# Patient Record
Sex: FEMALE | NOT HISPANIC OR LATINO | URBAN - METROPOLITAN AREA
[De-identification: names, ages, dates, MRNs, and addresses within clinical notes are randomized per-mention and may not be internally consistent; named-entity substitution may affect disease eponyms.]

---

## 2022-01-17 ENCOUNTER — TELEPHONE (OUTPATIENT)
Dept: PSYCHIATRY | Facility: CLINIC | Age: 27
End: 2022-01-17

## 2022-01-17 ENCOUNTER — LAB (OUTPATIENT)
Dept: LAB | Facility: HOSPITAL | Age: 27
End: 2022-01-17

## 2022-01-17 ENCOUNTER — OFFICE VISIT (OUTPATIENT)
Dept: PSYCHIATRY | Facility: CLINIC | Age: 27
End: 2022-01-17

## 2022-01-17 VITALS — WEIGHT: 289.2 LBS | DIASTOLIC BLOOD PRESSURE: 83 MMHG | SYSTOLIC BLOOD PRESSURE: 124 MMHG

## 2022-01-17 DIAGNOSIS — F31.4 BIPOLAR AFFECTIVE DISORDER, DEPRESSED, SEVERE: ICD-10-CM

## 2022-01-17 DIAGNOSIS — F31.4 BIPOLAR AFFECTIVE DISORDER, DEPRESSED, SEVERE: Primary | Chronic | ICD-10-CM

## 2022-01-17 DIAGNOSIS — F41.1 GAD (GENERALIZED ANXIETY DISORDER): Chronic | ICD-10-CM

## 2022-01-17 DIAGNOSIS — F41.1 GAD (GENERALIZED ANXIETY DISORDER): ICD-10-CM

## 2022-01-17 DIAGNOSIS — F90.0 ATTENTION DEFICIT HYPERACTIVITY DISORDER (ADHD), PREDOMINANTLY INATTENTIVE TYPE: Chronic | ICD-10-CM

## 2022-01-17 PROBLEM — F90.9 ATTENTION DEFICIT HYPERACTIVITY DISORDER: Chronic | Status: ACTIVE | Noted: 2022-01-17

## 2022-01-17 LAB
ALBUMIN SERPL-MCNC: 4.3 G/DL (ref 3.5–5.2)
ALBUMIN/GLOB SERPL: 1.4 G/DL
ALP SERPL-CCNC: 69 U/L (ref 39–117)
ALT SERPL W P-5'-P-CCNC: 18 U/L (ref 1–33)
ANION GAP SERPL CALCULATED.3IONS-SCNC: 10.1 MMOL/L (ref 5–15)
AST SERPL-CCNC: 16 U/L (ref 1–32)
BASOPHILS # BLD AUTO: 0.05 10*3/MM3 (ref 0–0.2)
BASOPHILS NFR BLD AUTO: 0.5 % (ref 0–1.5)
BILIRUB SERPL-MCNC: 0.2 MG/DL (ref 0–1.2)
BUN SERPL-MCNC: 15 MG/DL (ref 6–20)
BUN/CREAT SERPL: 23.4 (ref 7–25)
CALCIUM SPEC-SCNC: 9.5 MG/DL (ref 8.6–10.5)
CHLORIDE SERPL-SCNC: 102 MMOL/L (ref 98–107)
CHOLEST SERPL-MCNC: 146 MG/DL (ref 0–200)
CO2 SERPL-SCNC: 24.9 MMOL/L (ref 22–29)
CREAT SERPL-MCNC: 0.64 MG/DL (ref 0.57–1)
DEPRECATED RDW RBC AUTO: 45.6 FL (ref 37–54)
EOSINOPHIL # BLD AUTO: 0.11 10*3/MM3 (ref 0–0.4)
EOSINOPHIL NFR BLD AUTO: 1.1 % (ref 0.3–6.2)
ERYTHROCYTE [DISTWIDTH] IN BLOOD BY AUTOMATED COUNT: 14.5 % (ref 12.3–15.4)
GFR SERPL CREATININE-BSD FRML MDRD: 112 ML/MIN/1.73
GFR SERPL CREATININE-BSD FRML MDRD: 136 ML/MIN/1.73
GLOBULIN UR ELPH-MCNC: 3 GM/DL
GLUCOSE SERPL-MCNC: 79 MG/DL (ref 65–99)
HBA1C MFR BLD: 5.3 % (ref 3.5–5.6)
HCT VFR BLD AUTO: 39.2 % (ref 34–46.6)
HDLC SERPL-MCNC: 55 MG/DL (ref 40–60)
HGB BLD-MCNC: 12.9 G/DL (ref 12–15.9)
IMM GRANULOCYTES # BLD AUTO: 0.02 10*3/MM3 (ref 0–0.05)
IMM GRANULOCYTES NFR BLD AUTO: 0.2 % (ref 0–0.5)
LDLC SERPL CALC-MCNC: 72 MG/DL (ref 0–100)
LDLC/HDLC SERPL: 1.29 {RATIO}
LYMPHOCYTES # BLD AUTO: 3.82 10*3/MM3 (ref 0.7–3.1)
LYMPHOCYTES NFR BLD AUTO: 37.2 % (ref 19.6–45.3)
MCH RBC QN AUTO: 28.5 PG (ref 26.6–33)
MCHC RBC AUTO-ENTMCNC: 32.9 G/DL (ref 31.5–35.7)
MCV RBC AUTO: 86.5 FL (ref 79–97)
MONOCYTES # BLD AUTO: 0.59 10*3/MM3 (ref 0.1–0.9)
MONOCYTES NFR BLD AUTO: 5.7 % (ref 5–12)
NEUTROPHILS NFR BLD AUTO: 5.68 10*3/MM3 (ref 1.7–7)
NEUTROPHILS NFR BLD AUTO: 55.3 % (ref 42.7–76)
NRBC BLD AUTO-RTO: 0 /100 WBC (ref 0–0.2)
PLATELET # BLD AUTO: 413 10*3/MM3 (ref 140–450)
PMV BLD AUTO: 10.1 FL (ref 6–12)
POTASSIUM SERPL-SCNC: 4.5 MMOL/L (ref 3.5–5.2)
PROT SERPL-MCNC: 7.3 G/DL (ref 6–8.5)
RBC # BLD AUTO: 4.53 10*6/MM3 (ref 3.77–5.28)
SODIUM SERPL-SCNC: 137 MMOL/L (ref 136–145)
T4 FREE SERPL-MCNC: 1.08 NG/DL (ref 0.93–1.7)
TRIGL SERPL-MCNC: 101 MG/DL (ref 0–150)
TSH SERPL DL<=0.05 MIU/L-ACNC: 1 UIU/ML (ref 0.27–4.2)
VLDLC SERPL-MCNC: 19 MG/DL (ref 5–40)
WBC NRBC COR # BLD: 10.27 10*3/MM3 (ref 3.4–10.8)

## 2022-01-17 PROCEDURE — 83036 HEMOGLOBIN GLYCOSYLATED A1C: CPT

## 2022-01-17 PROCEDURE — 36415 COLL VENOUS BLD VENIPUNCTURE: CPT

## 2022-01-17 PROCEDURE — 80050 GENERAL HEALTH PANEL: CPT

## 2022-01-17 PROCEDURE — 90792 PSYCH DIAG EVAL W/MED SRVCS: CPT

## 2022-01-17 PROCEDURE — 84439 ASSAY OF FREE THYROXINE: CPT

## 2022-01-17 PROCEDURE — 80061 LIPID PANEL: CPT

## 2022-01-17 RX ORDER — LAMOTRIGINE 25 MG/1
25 TABLET ORAL DAILY
Qty: 15 TABLET | Refills: 0 | Status: SHIPPED | OUTPATIENT
Start: 2022-01-17 | End: 2022-01-31 | Stop reason: DRUGHIGH

## 2022-01-17 RX ORDER — LURASIDONE HYDROCHLORIDE 20 MG/1
20 TABLET, FILM COATED ORAL NIGHTLY
Qty: 30 TABLET | Refills: 0 | Status: SHIPPED | OUTPATIENT
Start: 2022-01-17 | End: 2022-01-17

## 2022-01-17 RX ORDER — BUSPIRONE HYDROCHLORIDE 15 MG/1
15 TABLET ORAL 2 TIMES DAILY
Qty: 60 TABLET | Refills: 2 | Status: SHIPPED | OUTPATIENT
Start: 2022-01-17 | End: 2022-06-09 | Stop reason: SDUPTHER

## 2022-01-17 NOTE — TELEPHONE ENCOUNTER
Latuda is over $1000.00 coupon will take off $400. Patient can not afford this. She will need something more affordable.              SHAISTA SAVINGS CARD:( [facility]  BIN: [id number]  GRP:[id number]  PCN: [id number]  ID: [id number]

## 2022-01-31 ENCOUNTER — TELEPHONE (OUTPATIENT)
Dept: PSYCHIATRY | Facility: CLINIC | Age: 27
End: 2022-01-31

## 2022-01-31 DIAGNOSIS — F41.1 GAD (GENERALIZED ANXIETY DISORDER): Chronic | ICD-10-CM

## 2022-01-31 DIAGNOSIS — F31.4 BIPOLAR AFFECTIVE DISORDER, DEPRESSED, SEVERE: Primary | Chronic | ICD-10-CM

## 2022-01-31 RX ORDER — LAMOTRIGINE 100 MG/1
TABLET ORAL
Qty: 32 TABLET | Refills: 0 | Status: SHIPPED | OUTPATIENT
Start: 2022-01-31 | End: 2022-03-09 | Stop reason: DRUGHIGH

## 2022-03-09 ENCOUNTER — OFFICE VISIT (OUTPATIENT)
Dept: PSYCHIATRY | Facility: CLINIC | Age: 27
End: 2022-03-09

## 2022-03-09 DIAGNOSIS — F31.4 BIPOLAR AFFECTIVE DISORDER, DEPRESSED, SEVERE: Chronic | ICD-10-CM

## 2022-03-09 DIAGNOSIS — F41.1 GAD (GENERALIZED ANXIETY DISORDER): Chronic | ICD-10-CM

## 2022-03-09 PROCEDURE — 99214 OFFICE O/P EST MOD 30 MIN: CPT | Performed by: PHYSICIAN ASSISTANT

## 2022-03-09 RX ORDER — LAMOTRIGINE 200 MG/1
200 TABLET ORAL DAILY
Qty: 90 TABLET | Refills: 1 | Status: SHIPPED | OUTPATIENT
Start: 2022-03-09 | End: 2022-06-09 | Stop reason: SDUPTHER

## 2022-03-09 RX ORDER — DESVENLAFAXINE 25 MG/1
25 TABLET, EXTENDED RELEASE ORAL EVERY MORNING
Qty: 30 TABLET | Refills: 3 | Status: SHIPPED | OUTPATIENT
Start: 2022-03-09 | End: 2022-03-09 | Stop reason: SDUPTHER

## 2022-03-09 RX ORDER — DESVENLAFAXINE 25 MG/1
25 TABLET, EXTENDED RELEASE ORAL EVERY MORNING
Qty: 30 TABLET | Refills: 3 | Status: SHIPPED | OUTPATIENT
Start: 2022-03-09 | End: 2022-06-09 | Stop reason: SDUPTHER

## 2022-03-09 NOTE — PROGRESS NOTES
Subjective   Kaylee Vergara is a 26 y.o. female who presents today for follow up in the office.  Patient was scheduled with the wrong provider today, and ARTHUR Brumfield is off today, so I saw the patient and she will schedule with him for next visit    Chief Complaint: Bipolar depression, anxiety with ADHD concern.    History of Present Illness:  Patient reports feeling better already since starting Lamictal (now on 200 mg daily) and Buspar, but still concerned about her depression (mom takes Pristiq and doing well on it)  -Started Effexor after had first child in 2019 due to postpartum depression.  -Been off previous meds (Abilify, buspirone, and Effexor) for nearly a month, since mid-December, because she moved to the area from [place] in  and couldn't obtain refills.  -Previously took 15mg Abilify per her last psychiatrist since .  -Pt says she has not felt like herself since after she started having kids.  -Pt c/o concentration and focus difficulty that has been present her entire life, and she decided she should be evaluated for ADHD before starting her new job.  -Admits to SI w/o ever establishing plans or anticipating actions. -Denies HI or AVH.  Possible Manic Sx on MDQ: 7  Depression 4/10 (was a 9/10), doing better since starting Lamictal and Buspar  Anxiety 2/10    -lives with  and 3 boys. 2 of which are twins born toward end of .    The following portions of the patient's history were reviewed and updated as appropriate: allergies, current medications, past family history, past medical history, past social history, past surgical history and problem list.    PAST OUTPATIENT TREATMENT  Past Psychiatric History:  Diagnoses: Bipolar d/o, RODERICK, Postpartum Depression.  Previous Psychiatrist: In [place], made bipolar dx.  Therapist: Denied.   Psychosis, Anx/Dep: Postpartum Depression .  Genesight testing done 2022, normal converter of folic acid  Medication  Trials:  Wellbutrin - worse anxiety.  Zoloft - sedating all the time  Effexor - inefficacy up to 150mg.  Abilify - inefficacy up to 15mg.  Buspirone - previously tried up to 10mg BID w/o efficacy, helpful at 15 mg BID  Latuda - cost prohibitive.  Lamictal, helpful  Sequelae Of Mental Disorder:  job disruption, family disruption, emotional distress    Interval History  Improved    Side Effects  Denied.    Past psychiatric history was reviewed and compared to 1/17/2022 visit and appropriate updates were made.    Past Medical History:  Past Medical History:   Diagnosis Date   • Anxiety    • Depression      Social History:  Social History     Socioeconomic History   • Marital status:    Tobacco Use   • Smoking status: Never Smoker   • Smokeless tobacco: Never Used   Substance and Sexual Activity   • Alcohol use: No   • Drug use: Yes     Frequency: 1.0 times per week     Types: Marijuana     Comment: smokes cannabis weekly.     Family History:  History reviewed. No pertinent family history.  Past Surgical History:  Past Surgical History:   Procedure Laterality Date   • KNEE SURGERY      BOTH SIDES     Problem List:  Patient Active Problem List   Diagnosis   • RODERICK (generalized anxiety disorder)   • Bipolar affective disorder, depressed, severe (HCC)   • Attention deficit hyperactivity disorder     Allergy:   Allergies   Allergen Reactions   • Biaxin [Clarithromycin]    • Ceclor [Cefaclor]       Discontinued Medications:  Medications Discontinued During This Encounter   Medication Reason   • sertraline (ZOLOFT) 50 MG tablet *Therapy completed   • lamoTRIgine (LaMICtal) 100 MG tablet Dose adjustment     Current Medications:   Current Outpatient Medications   Medication Sig Dispense Refill   • busPIRone (BUSPAR) 15 MG tablet Take 1 tablet by mouth 2 (Two) Times a Day. 60 tablet 2   • Desvenlafaxine Succinate ER 25 MG tablet sustained-release 24 hour Take 1 tablet by mouth Every Morning. 30 tablet 3   • lamoTRIgine  (LaMICtal) 200 MG tablet Take 1 tablet by mouth Daily. 90 tablet 1     No current facility-administered medications for this visit.     Psychological ROS: positive for - anxiety, concentration difficulties, depression, memory difficulties, sleep disturbances and suicidal ideation  negative for - disorientation, hallucinations or hostility    Vital Signs:   There were no vitals taken for this visit.   Wt: 289.2lbs (1/17/22)  BP: 124/83 (1/17/22)    Mental Status Exam:   Orientation:  To person, Place, Time and Situation  Memory: Recent and remote memory Intact  Mood/Affect: Normal  Hopelessness: Denies  Suicidal Ideations: Suicidal Ideation  Homicidal Ideations:  None  Hallucinations: None  Delusions:  None  Obsessions: None  Behavior and Psychomotor Activity: Appropriate  Speech:  Normal  Thought Process: Goal directed, Linear and Circum  Thought Content: Normal  Associations: Intact  Language: name objects and repeat phrases  Concentration and computation: Fair  Attention Span: Poor  Fund of Knowledge: Fair  Reliability: fair  Insight into mental health: Fair  Judgement: Fair  Impulse Control:  Fair  Hygiene: good  Cooperation:  Cooperative  Eye Contact:  Good  Physical/Medical Issues:  No     Mental status exam was reviewed and compared to 1/17/2022 visit and appropriate updates were made.    PHQ-9 Depression Screening  Little interest or pleasure in doing things?     Feeling down, depressed, or hopeless?     Trouble falling or staying asleep, or sleeping too much?     Feeling tired or having little energy?     Poor appetite or overeating?     Feeling bad about yourself - or that you are a failure or have let yourself or your family down?     Trouble concentrating on things, such as reading the newspaper or watching television?     Moving or speaking so slowly that other people could have noticed? Or the opposite - being so fidgety or restless that you have been moving around a lot more than usual?     Thoughts  that you would be better off dead, or of hurting yourself in some way?     PHQ-9 Total Score  10   If you checked off any problems, how difficult have these problems made it for you to do your work, take care of things at home, or get along with other people?  Somewhat difficult      PHQ-9: 10  RODERICK-7:  6  MDQ: Positive; likely bipolar d/o w/ 7 manic sx and hx of dx. (1/17/22)  ASRS: 33 (Inattentive); 28 (Hyperactive). Highly likely ADHD. (1/17/22)    Never smoker; smokes cannabis weekly.    I advised Kaylee of the risks of tobacco use.     Lab Results: Reviewed.    EKG Results:  No orders to display     Assessment/Plan   Diagnoses and all orders for this visit:    1. Bipolar affective disorder, depressed, severe (HCC)    2. RODERICK (generalized anxiety disorder)    Other orders  -     lamoTRIgine (LaMICtal) 200 MG tablet; Take 1 tablet by mouth Daily.  Dispense: 90 tablet; Refill: 1  -     Desvenlafaxine Succinate ER 25 MG tablet sustained-release 24 hour; Take 1 tablet by mouth Every Morning.  Dispense: 30 tablet; Refill: 3    Continue 15mg Buspirone BID for improved anxiety control  Continue Lamictal 200 mg daily for mood stabilizer  [employer] results were reviewed with the patient and she was given a copy for her records.  Start Pristiq 25 mg daily for depression, it is use as directed per [employer] and her mom has been successful with Pristiq, as well.  Consider ADHD tx. at next visit when she meets again with Severino Brumfield    Visit Diagnoses:    ICD-10-CM ICD-9-CM   1. Bipolar affective disorder, depressed, severe (HCC)  F31.4 296.53   2. RODERICK (generalized anxiety disorder)  F41.1 300.02        TREATMENT PLAN/GOALS: In the short-term, the patient is to continue supportive psychotherapy efforts and medications as indicated. Treatment and medication options were discussed during today's visit. Long-term the goal will be to control symptoms so they do not negatively interfere with other aspects of the patient's  life. Prognosis is optimistic with implementation of the current treatment plan. Patient ackowledged and verbally consented to the treatment plan and was educated on the importance of compliance with treatment and follow-up appointments.    MEDICATION ISSUES:  INSPECT reviewed and is as expected. No controlled Rxs.    Discussed medication options and treatment plan of prescribed medication as well as the risks, benefits, and side effects including potential falls, possible impaired driving, and metabolic adversities among others. Patient counseled on a multimodal approach with healthy nutrition, healthy sleep, regular physical activity, social activity, counseling, and medication taking part in his/her psychiatric management. Patient is agreeable to the plan, and he/she agreed to call the office with any worsening of symptoms or onset of side effects. Patient is agreeable to call 911 or go to the nearest ER should he/she begin having SI/HI with plans or anticipated actions or self-harming behavior.    Assisted patient in processing above session content; acknowledged and normalized patient’s thoughts, feelings, and concerns. Reviewed positive coping skills and behavior management in session with positive framing of thoughts. Allowed patient to freely discuss issues without interruption or judgment. Provided safe, confidential environment to facilitate the development of positive therapeutic relationship and encourage open and honest communication.    MEDS ORDERED DURING VISIT:  New Medications Ordered This Visit   Medications   • lamoTRIgine (LaMICtal) 200 MG tablet     Sig: Take 1 tablet by mouth Daily.     Dispense:  90 tablet     Refill:  1   • Desvenlafaxine Succinate ER 25 MG tablet sustained-release 24 hour     Sig: Take 1 tablet by mouth Every Morning.     Dispense:  30 tablet     Refill:  3     Return in about 3 months (around 6/9/2022).     This document has been electronically signed by Nataliya Marie  EV  March 9, 2022 11:53 EST    EMR Dragon transcription disclaimer:  Part of this note may be an electronic transcription/translation of spoken language to printed text using the Dragon Dictation System.

## 2022-03-15 RX ORDER — DESVENLAFAXINE 25 MG/1
25 TABLET, EXTENDED RELEASE ORAL EVERY MORNING
Qty: 30 TABLET | Refills: 3 | Status: CANCELLED | OUTPATIENT
Start: 2022-03-15

## 2022-03-15 NOTE — TELEPHONE ENCOUNTER
Desvenlafaxine Succ ER 25mg take one tab in the morning. Error This medication was filled 3/9/2022 with three refills

## 2022-06-09 ENCOUNTER — OFFICE VISIT (OUTPATIENT)
Dept: PSYCHIATRY | Facility: CLINIC | Age: 27
End: 2022-06-09

## 2022-06-09 VITALS
HEART RATE: 66 BPM | OXYGEN SATURATION: 98 % | WEIGHT: 258 LBS | HEIGHT: 68 IN | SYSTOLIC BLOOD PRESSURE: 112 MMHG | BODY MASS INDEX: 39.1 KG/M2 | DIASTOLIC BLOOD PRESSURE: 70 MMHG

## 2022-06-09 DIAGNOSIS — F51.05 INSOMNIA DUE TO MENTAL DISORDER: Chronic | ICD-10-CM

## 2022-06-09 DIAGNOSIS — F31.30 BIPOLAR I DISORDER, MOST RECENT EPISODE DEPRESSED: Primary | Chronic | ICD-10-CM

## 2022-06-09 DIAGNOSIS — F41.1 GAD (GENERALIZED ANXIETY DISORDER): Chronic | ICD-10-CM

## 2022-06-09 PROBLEM — E66.9 OBESITY WITH BODY MASS INDEX 30 OR GREATER: Status: ACTIVE | Noted: 2019-06-27

## 2022-06-09 PROCEDURE — 99214 OFFICE O/P EST MOD 30 MIN: CPT

## 2022-06-09 RX ORDER — AMOXICILLIN 500 MG/1
500 CAPSULE ORAL 2 TIMES DAILY
COMMUNITY
Start: 2022-06-07 | End: 2022-08-09

## 2022-06-09 RX ORDER — LAMOTRIGINE 200 MG/1
200 TABLET ORAL DAILY
Qty: 90 TABLET | Refills: 1 | Status: SHIPPED | OUTPATIENT
Start: 2022-06-09 | End: 2022-11-15 | Stop reason: SDUPTHER

## 2022-06-09 RX ORDER — BUSPIRONE HYDROCHLORIDE 15 MG/1
15 TABLET ORAL 2 TIMES DAILY
Qty: 180 TABLET | Refills: 1 | Status: SHIPPED | OUTPATIENT
Start: 2022-06-09 | End: 2022-11-15 | Stop reason: SDUPTHER

## 2022-06-09 RX ORDER — TRAZODONE HYDROCHLORIDE 50 MG/1
50 TABLET ORAL NIGHTLY
Qty: 30 TABLET | Refills: 2 | Status: SHIPPED | OUTPATIENT
Start: 2022-06-09 | End: 2022-11-15

## 2022-06-09 RX ORDER — PROMETHAZINE HYDROCHLORIDE 25 MG/1
SUPPOSITORY RECTAL
COMMUNITY
End: 2022-08-09

## 2022-06-09 RX ORDER — DESVENLAFAXINE 25 MG/1
25 TABLET, EXTENDED RELEASE ORAL EVERY MORNING
Qty: 90 TABLET | Refills: 1 | Status: SHIPPED | OUTPATIENT
Start: 2022-06-09 | End: 2022-08-09

## 2022-06-09 RX ORDER — ONDANSETRON 8 MG/1
TABLET, ORALLY DISINTEGRATING ORAL EVERY 12 HOURS SCHEDULED
COMMUNITY
End: 2022-08-09

## 2022-06-09 RX ORDER — LIRAGLUTIDE 6 MG/ML
3 INJECTION, SOLUTION SUBCUTANEOUS
COMMUNITY
End: 2022-08-09

## 2022-08-09 ENCOUNTER — OFFICE VISIT (OUTPATIENT)
Dept: PSYCHIATRY | Facility: CLINIC | Age: 27
End: 2022-08-09

## 2022-08-09 VITALS
HEART RATE: 83 BPM | WEIGHT: 259.2 LBS | OXYGEN SATURATION: 99 % | BODY MASS INDEX: 39.41 KG/M2 | SYSTOLIC BLOOD PRESSURE: 118 MMHG | DIASTOLIC BLOOD PRESSURE: 78 MMHG

## 2022-08-09 DIAGNOSIS — F31.30 BIPOLAR I DISORDER, MOST RECENT EPISODE DEPRESSED: Primary | Chronic | ICD-10-CM

## 2022-08-09 DIAGNOSIS — F41.1 GAD (GENERALIZED ANXIETY DISORDER): Chronic | ICD-10-CM

## 2022-08-09 DIAGNOSIS — F90.0 ATTENTION DEFICIT HYPERACTIVITY DISORDER (ADHD), PREDOMINANTLY INATTENTIVE TYPE: Chronic | ICD-10-CM

## 2022-08-09 DIAGNOSIS — F51.05 INSOMNIA DUE TO MENTAL DISORDER: Chronic | ICD-10-CM

## 2022-08-09 PROCEDURE — 99214 OFFICE O/P EST MOD 30 MIN: CPT

## 2022-09-07 ENCOUNTER — HOSPITAL ENCOUNTER (OUTPATIENT)
Facility: HOSPITAL | Age: 27
Discharge: HOME | End: 2022-09-07
Attending: EMERGENCY MEDICINE

## 2022-09-07 VITALS
SYSTOLIC BLOOD PRESSURE: 142 MMHG | OXYGEN SATURATION: 100 % | DIASTOLIC BLOOD PRESSURE: 84 MMHG | HEART RATE: 103 BPM | WEIGHT: 255 LBS | RESPIRATION RATE: 16 BRPM | BODY MASS INDEX: 37.77 KG/M2 | HEIGHT: 69 IN | TEMPERATURE: 98.1 F

## 2022-09-07 DIAGNOSIS — J01.00 ACUTE MAXILLARY SINUSITIS, RECURRENCE NOT SPECIFIED: Primary | ICD-10-CM

## 2022-09-07 DIAGNOSIS — U07.1 COVID-19: ICD-10-CM

## 2022-09-07 LAB
FLUAV SUBTYP SPEC NAA+PROBE: NOT DETECTED
FLUBV RNA ISLT QL NAA+PROBE: NOT DETECTED
SARS-COV-2 RNA RESP QL NAA+PROBE: NOT DETECTED

## 2022-09-07 PROCEDURE — EDLOS: Performed by: EMERGENCY MEDICINE

## 2022-09-07 PROCEDURE — 87636 SARSCOV2 & INF A&B AMP PRB: CPT | Performed by: EMERGENCY MEDICINE

## 2022-09-07 PROCEDURE — 99202 OFFICE O/P NEW SF 15 MIN: CPT | Performed by: EMERGENCY MEDICINE

## 2022-09-07 PROCEDURE — G0463 HOSPITAL OUTPT CLINIC VISIT: HCPCS | Performed by: EMERGENCY MEDICINE

## 2022-09-07 RX ORDER — NAPROXEN 500 MG/1
500 TABLET ORAL 2 TIMES DAILY WITH MEALS
Qty: 20 TABLET | Refills: 0 | Status: SHIPPED | OUTPATIENT
Start: 2022-09-07 | End: 2022-09-17

## 2022-09-07 RX ORDER — AMOXICILLIN AND CLAVULANATE POTASSIUM 875; 125 MG/1; MG/1
1 TABLET, FILM COATED ORAL 2 TIMES DAILY
Qty: 20 TABLET | Refills: 0 | Status: SHIPPED | OUTPATIENT
Start: 2022-09-07 | End: 2022-09-17

## 2022-10-22 ENCOUNTER — HOSPITAL ENCOUNTER (OUTPATIENT)
Facility: HOSPITAL | Age: 27
Discharge: HOME | End: 2022-10-22
Attending: EMERGENCY MEDICINE

## 2022-10-22 VITALS
TEMPERATURE: 100.7 F | RESPIRATION RATE: 18 BRPM | WEIGHT: 255 LBS | SYSTOLIC BLOOD PRESSURE: 153 MMHG | HEIGHT: 69 IN | DIASTOLIC BLOOD PRESSURE: 97 MMHG | HEART RATE: 115 BPM | BODY MASS INDEX: 37.77 KG/M2 | OXYGEN SATURATION: 96 %

## 2022-10-22 DIAGNOSIS — R11.2 NAUSEA VOMITING AND DIARRHEA: ICD-10-CM

## 2022-10-22 DIAGNOSIS — B34.9 VIRAL ILLNESS: Primary | ICD-10-CM

## 2022-10-22 DIAGNOSIS — U07.1 COVID: ICD-10-CM

## 2022-10-22 DIAGNOSIS — R19.7 NAUSEA VOMITING AND DIARRHEA: ICD-10-CM

## 2022-10-22 LAB
FLUAV SUBTYP SPEC NAA+PROBE: NOT DETECTED
FLUBV RNA ISLT QL NAA+PROBE: NOT DETECTED
SARS-COV-2 RNA RESP QL NAA+PROBE: DETECTED

## 2022-10-22 PROCEDURE — 99212 OFFICE O/P EST SF 10 MIN: CPT | Performed by: EMERGENCY MEDICINE

## 2022-10-22 PROCEDURE — EDLOS: Performed by: EMERGENCY MEDICINE

## 2022-10-22 PROCEDURE — 87636 SARSCOV2 & INF A&B AMP PRB: CPT | Performed by: EMERGENCY MEDICINE

## 2022-10-22 PROCEDURE — 63710000001 ONDANSETRON ODT 4 MG TABLET DISPERSIBLE: Performed by: EMERGENCY MEDICINE

## 2022-10-22 PROCEDURE — G0463 HOSPITAL OUTPT CLINIC VISIT: HCPCS | Performed by: EMERGENCY MEDICINE

## 2022-10-22 RX ORDER — ACETAMINOPHEN 500 MG
1000 TABLET ORAL ONCE
Status: COMPLETED | OUTPATIENT
Start: 2022-10-22 | End: 2022-10-22

## 2022-10-22 RX ORDER — ONDANSETRON 4 MG/1
4 TABLET, ORALLY DISINTEGRATING ORAL EVERY 8 HOURS PRN
Qty: 15 TABLET | Refills: 0 | Status: SHIPPED | OUTPATIENT
Start: 2022-10-22

## 2022-10-22 RX ORDER — ONDANSETRON 4 MG/1
4 TABLET, ORALLY DISINTEGRATING ORAL ONCE
Status: COMPLETED | OUTPATIENT
Start: 2022-10-22 | End: 2022-10-22

## 2022-10-22 RX ADMIN — ACETAMINOPHEN 1000 MG: 500 TABLET ORAL at 08:44

## 2022-10-22 RX ADMIN — ONDANSETRON 4 MG: 4 TABLET, ORALLY DISINTEGRATING ORAL at 08:44

## 2022-11-13 NOTE — PROGRESS NOTES
"Subjective   Kaylee Vergara is a 27 y.o. female who presents today for follow up for psychiatric medication management. Patient is new to this provider but previously saw SOFIA Johnson.     Chief Complaint:  \"I'm not doing very well.\"    History of Present Illness:   Patient states she was doing well on her current medications in the summer and up until September. In September she moved into a new house with her  and children and things were busy and she stopped taking all her her medication. At the end of September, she went back on all her medications, but she still has not been doing well.     Patient found out two weeks ago that  was speaking to another person outside their marriage. This has really upset her.   Her family just moved into a new house in September.   Patient states \"I've been very angry and sad at the beginning of the day.\"   \"I'm very easily frustrated with my children.\"  \"I'm very forgetful.\"   \"I can't focus on something simple, like cleaning the kitchen.\"     Sleep is not well at all. Sleeping only 3.5-4 hours at night.   SI--no active plans to do anything, but since the affair, states she just wants the day to end. Feels safe calling if she were to have worsening SI.     Appetite, not good.     Overall, the patient states she is just not doing well like she was back in the summer. She reports an inability to focus on her daily activities and she believes if she had some help with her attention and focus, it would lessen some of her anxiety. The provider she previously saw here in the clinic, did complete an ASRS screening on her. Results as follows: ASRS: 33 (Inattentive); 28 (Hyperactive). Highly likely ADHD. (1/17/22)    The following portions of the patient's history were reviewed and updated as appropriate: allergies, current medications, past family history, past medical history, past social history, past surgical history and problem list.    PAST PSYCHIATRIC " HISTORY  Axis I  Generalized anxiety disorder  Bipolar disorder  ADHD  Axis II  None    PAST OUTPATIENT TREATMENT  Diagnosis treated:  Bipolar Disorder  Generalized anxiety disorder  Postpartum depression    Treatment Type:  Medication Management  Genesight testing done Jan 2022, normal converter of folic acid.  Prior Psychiatric Medications:  Wellbutrin - worse anxiety.  Zoloft - sedating all the time  Effexor - inefficacy up to 150mg.  Abilify - inefficacy up to 15mg.  Buspirone - up to 10mg BID w/o efficacy, helpful at 15 mg BID.  Latuda - cost prohibitive.  Lamictal - helpful  Vraylar-effective  Pristiq-ineffective at 25 mg.  Trazodone-effective for sleep at 50 mg.  Support Groups:  None   Sequelae Of Mental Disorder:  family disruption, emotional distress    Interval History  Deteriorated    Side Effects  No side effects noted.       Past Medical History:  Past Medical History:   Diagnosis Date   • Anxiety    • Depression        Social History:  Social History     Socioeconomic History   • Marital status:    Tobacco Use   • Smoking status: Never   • Smokeless tobacco: Never   Vaping Use   • Vaping Use: Never used   Substance and Sexual Activity   • Alcohol use: No     Comment: socially   • Drug use: Yes     Frequency: 1.0 times per week     Types: Marijuana     Comment: smokes cannabis weekly.       Family History:  No family history on file.    Past Surgical History:  Past Surgical History:   Procedure Laterality Date   • KNEE SURGERY      BOTH SIDES       Problem List:  Patient Active Problem List   Diagnosis   • RODERICK (generalized anxiety disorder)   • Bipolar I disorder, most recent episode depressed (HCC)   • Attention deficit hyperactivity disorder   • Insomnia due to mental disorder   • Obesity with body mass index 30 or greater       Allergy:   Allergies   Allergen Reactions   • Biaxin [Clarithromycin]    • Ceclor [Cefaclor]         Discontinued Medications:  Medications Discontinued During This  Encounter   Medication Reason   • Desvenlafaxine Succinate ER 25 MG tablet sustained-release 24 hour *Therapy completed   • traZODone (DESYREL) 50 MG tablet    • busPIRone (BUSPAR) 15 MG tablet Reorder   • lamoTRIgine (LaMICtal) 200 MG tablet Reorder   • Cariprazine HCl (Vraylar) 3 MG capsule capsule        Current Medications:   Current Outpatient Medications   Medication Sig Dispense Refill   • busPIRone (BUSPAR) 15 MG tablet Take 1 tablet by mouth 2 (Two) Times a Day. 180 tablet 1   • Cariprazine HCl (Vraylar) 3 MG capsule capsule Take 1 capsule by mouth Every Morning. 30 capsule 2   • lamoTRIgine (LaMICtal) 200 MG tablet Take 1 tablet by mouth Daily. 90 tablet 1   • traZODone (DESYREL) 50 MG tablet Take 2 tablets by mouth Every Night. 30 tablet 2   • atomoxetine (Strattera) 40 MG capsule Take 1 capsule by mouth Daily. In the morning 30 capsule 1   • hydrOXYzine (ATARAX) 25 MG tablet Take 1 tablet by mouth 2 (Two) Times a Day As Needed for Anxiety. 60 tablet 1   • ondansetron ODT (ZOFRAN-ODT) 4 MG disintegrating tablet Place 1 tablet on the tongue Every 8 (Eight) Hours As Needed for Nausea or Vomiting. 15 tablet 0     No current facility-administered medications for this visit.         Psychological ROS: positive for - anxiety, concentration difficulties, depression, irritability and sleep disturbances  negative for - behavioral disorder, hallucinations, hostility or mood swings      Physical Exam:   Blood pressure 108/72, pulse 80, weight 114 kg (250 lb 6.4 oz), SpO2 99 %.    Mental Status Exam:   Hygiene:   good  Cooperation:  Cooperative  Eye Contact:  Good  Psychomotor Behavior:  Appropriate  Affect:  Appropriate  Mood: depressed and anxious  Speech:  Normal  Thought Process:  Goal directed and Linear  Thought Content:  Normal  Suicidal:  None  Homicidal:  None  Hallucinations:  None  Delusion:  None  Memory:  Intact  Orientation:  Person, Place, Time and Situation  Reliability:  good  Insight:   Good  Judgement:  Good  Impulse Control:  Good  Physical/Medical Issues:  No        PHQ-9 Depression Screening    Little interest or pleasure in doing things? 3-->nearly every day   Feeling down, depressed, or hopeless? 3-->nearly every day   Trouble falling or staying asleep, or sleeping too much? 3-->nearly every day   Feeling tired or having little energy? 2-->more than half the days   Poor appetite or overeating? 2-->more than half the days   Feeling bad about yourself - or that you are a failure or have let yourself or your family down? 2-->more than half the days   Trouble concentrating on things, such as reading the newspaper or watching television? 3-->nearly every day   Moving or speaking so slowly that other people could have noticed? Or the opposite - being so fidgety or restless that you have been moving around a lot more than usual? 2-->more than half the days   Thoughts that you would be better off dead, or of hurting yourself in some way? 0-->not at all   PHQ-9 Total Score 20   If you checked off any problems, how difficult have these problems made it for you to do your work, take care of things at home, or get along with other people? very difficult            Never smoker    I advised Kaylee of the risks of tobacco use.     Result Review:  I have personally reviewed the results from the 1/17/22 and agree with these findings:  [x]  Laboratory  []  Microbiology  []  Radiology  []  EKG/Telemetry   []  Cardiology/Vascular   []  Pathology  []  Old records  []  Other:  Most notable findings include: No concerning laboratory findings.     Assessment & Plan   Diagnoses and all orders for this visit:    1. Bipolar I disorder, most recent episode depressed (HCC) (Primary)  -     Cariprazine HCl (Vraylar) 3 MG capsule capsule; Take 1 capsule by mouth Every Morning.  Dispense: 30 capsule; Refill: 2  -     lamoTRIgine (LaMICtal) 200 MG tablet; Take 1 tablet by mouth Daily.  Dispense: 90 tablet; Refill: 1    2.  RODERICK (generalized anxiety disorder)  -     busPIRone (BUSPAR) 15 MG tablet; Take 1 tablet by mouth 2 (Two) Times a Day.  Dispense: 180 tablet; Refill: 1  -     Cariprazine HCl (Vraylar) 3 MG capsule capsule; Take 1 capsule by mouth Every Morning.  Dispense: 30 capsule; Refill: 2  -     lamoTRIgine (LaMICtal) 200 MG tablet; Take 1 tablet by mouth Daily.  Dispense: 90 tablet; Refill: 1  -     hydrOXYzine (ATARAX) 25 MG tablet; Take 1 tablet by mouth 2 (Two) Times a Day As Needed for Anxiety.  Dispense: 60 tablet; Refill: 1    3. Attention deficit hyperactivity disorder (ADHD), predominantly inattentive type  -     atomoxetine (Strattera) 40 MG capsule; Take 1 capsule by mouth Daily. In the morning  Dispense: 30 capsule; Refill: 1    4. Insomnia due to mental disorder  -     traZODone (DESYREL) 50 MG tablet; Take 2 tablets by mouth Every Night.  Dispense: 30 tablet; Refill: 2         Visit Diagnoses:    ICD-10-CM ICD-9-CM   1. Bipolar I disorder, most recent episode depressed (HCC)  F31.30 296.50   2. RODERICK (generalized anxiety disorder)  F41.1 300.02   3. Attention deficit hyperactivity disorder (ADHD), predominantly inattentive type  F90.0 314.00   4. Insomnia due to mental disorder  F51.05 300.9     327.02       TREATMENT PLAN/GOALS: Continue supportive psychotherapy efforts and medications as indicated. Treatment and medication options discussed during today's visit. Patient ackowledged and verbally consented to continue with current treatment plan and was educated on the importance of compliance with treatment and follow-up appointments.    MEDICATION ISSUES:  INSPECT reviewed as expected    Start Straterra 40mg in the morning to help with focus and attention.   Stop taking Pristiq as patient does not see any benefit at this time.   Increase trazodone from 50mg nightly to 100mg nightly to improve sleep.   Continue Vraylar 3mg and lamotrigine 200mg daily for mood stabilization.   Start hydroxyzine 25mg BID PRN for  anxiety.     Discussed medication options and treatment plan of prescribed medication as well as the risks, benefits, and side effects including potential falls, possible impaired driving and metabolic adversities among others. Patient is agreeable to call the office with any worsening of symptoms or onset of side effects. Patient is agreeable to call 911 or go to the nearest ER should he/she begin having SI/HI. No medication side effects or related complaints today.     MEDS ORDERED DURING VISIT:  New Medications Ordered This Visit   Medications   • busPIRone (BUSPAR) 15 MG tablet     Sig: Take 1 tablet by mouth 2 (Two) Times a Day.     Dispense:  180 tablet     Refill:  1   • Cariprazine HCl (Vraylar) 3 MG capsule capsule     Sig: Take 1 capsule by mouth Every Morning.     Dispense:  30 capsule     Refill:  2   • lamoTRIgine (LaMICtal) 200 MG tablet     Sig: Take 1 tablet by mouth Daily.     Dispense:  90 tablet     Refill:  1   • traZODone (DESYREL) 50 MG tablet     Sig: Take 2 tablets by mouth Every Night.     Dispense:  30 tablet     Refill:  2   • hydrOXYzine (ATARAX) 25 MG tablet     Sig: Take 1 tablet by mouth 2 (Two) Times a Day As Needed for Anxiety.     Dispense:  60 tablet     Refill:  1   • atomoxetine (Strattera) 40 MG capsule     Sig: Take 1 capsule by mouth Daily. In the morning     Dispense:  30 capsule     Refill:  1       Return in about 4 weeks (around 12/13/2022).         This document has been electronically signed by ELE Shelton  November 15, 2022 09:53 EST    Part of this note may be an electronic transcription/translation of spoken language to printed text using the Dragon Dictation System.

## 2022-11-15 ENCOUNTER — OFFICE VISIT (OUTPATIENT)
Dept: PSYCHIATRY | Facility: CLINIC | Age: 27
End: 2022-11-15

## 2022-11-15 VITALS
SYSTOLIC BLOOD PRESSURE: 108 MMHG | BODY MASS INDEX: 36.98 KG/M2 | HEART RATE: 80 BPM | WEIGHT: 250.4 LBS | DIASTOLIC BLOOD PRESSURE: 72 MMHG | OXYGEN SATURATION: 99 %

## 2022-11-15 DIAGNOSIS — F51.05 INSOMNIA DUE TO MENTAL DISORDER: Chronic | ICD-10-CM

## 2022-11-15 DIAGNOSIS — F41.1 GAD (GENERALIZED ANXIETY DISORDER): Chronic | ICD-10-CM

## 2022-11-15 DIAGNOSIS — F90.0 ATTENTION DEFICIT HYPERACTIVITY DISORDER (ADHD), PREDOMINANTLY INATTENTIVE TYPE: Chronic | ICD-10-CM

## 2022-11-15 DIAGNOSIS — F31.30 BIPOLAR I DISORDER, MOST RECENT EPISODE DEPRESSED: Primary | Chronic | ICD-10-CM

## 2022-11-15 PROCEDURE — 99214 OFFICE O/P EST MOD 30 MIN: CPT

## 2022-11-15 RX ORDER — DESVENLAFAXINE 25 MG/1
25 TABLET, EXTENDED RELEASE ORAL EVERY MORNING
COMMUNITY
Start: 2022-11-03 | End: 2022-11-15

## 2022-11-15 RX ORDER — LAMOTRIGINE 200 MG/1
200 TABLET ORAL DAILY
Qty: 90 TABLET | Refills: 1 | Status: SHIPPED | OUTPATIENT
Start: 2022-11-15

## 2022-11-15 RX ORDER — TRAZODONE HYDROCHLORIDE 50 MG/1
100 TABLET ORAL NIGHTLY
Qty: 30 TABLET | Refills: 2 | Status: SHIPPED | OUTPATIENT
Start: 2022-11-15

## 2022-11-15 RX ORDER — BUSPIRONE HYDROCHLORIDE 15 MG/1
15 TABLET ORAL 2 TIMES DAILY
Qty: 180 TABLET | Refills: 1 | Status: SHIPPED | OUTPATIENT
Start: 2022-11-15 | End: 2022-12-13

## 2022-11-15 RX ORDER — HYDROXYZINE HYDROCHLORIDE 25 MG/1
25 TABLET, FILM COATED ORAL 2 TIMES DAILY PRN
Qty: 60 TABLET | Refills: 1 | Status: SHIPPED | OUTPATIENT
Start: 2022-11-15 | End: 2023-01-30

## 2022-11-15 RX ORDER — ATOMOXETINE 40 MG/1
40 CAPSULE ORAL DAILY
Qty: 30 CAPSULE | Refills: 1 | Status: SHIPPED | OUTPATIENT
Start: 2022-11-15 | End: 2023-01-19

## 2022-11-17 ENCOUNTER — OFFICE VISIT (OUTPATIENT)
Dept: PSYCHIATRY | Facility: CLINIC | Age: 27
End: 2022-11-17

## 2022-11-17 DIAGNOSIS — F41.1 GAD (GENERALIZED ANXIETY DISORDER): Chronic | ICD-10-CM

## 2022-11-17 DIAGNOSIS — F33.2 SEVERE EPISODE OF RECURRENT MAJOR DEPRESSIVE DISORDER, WITHOUT PSYCHOTIC FEATURES: Primary | ICD-10-CM

## 2022-11-17 PROCEDURE — 90791 PSYCH DIAGNOSTIC EVALUATION: CPT | Performed by: SOCIAL WORKER
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
## 2022-12-01 ENCOUNTER — OFFICE VISIT (OUTPATIENT)
Dept: PSYCHIATRY | Facility: CLINIC | Age: 27
End: 2022-12-01

## 2022-12-01 DIAGNOSIS — F51.05 INSOMNIA DUE TO MENTAL DISORDER: Chronic | ICD-10-CM

## 2022-12-01 DIAGNOSIS — F41.1 GAD (GENERALIZED ANXIETY DISORDER): Primary | Chronic | ICD-10-CM

## 2022-12-01 PROCEDURE — 90834 PSYTX W PT 45 MINUTES: CPT | Performed by: SOCIAL WORKER

## 2022-12-01 NOTE — PATIENT INSTRUCTIONS
Phone Apps for mood/anxiety and relaxation     Mood / Depression  Moodfit  MoodTools  Moodkit  Daylio  MoodTrack Diary  ImoodJournal   T2 Mood Tracker   Anxiety  Worry Watch  MAEVE Self-Help for Anxiety Management   MindShift   Relaxation  [id number]

## 2022-12-01 NOTE — PROGRESS NOTES
Date: December 1, 2022  Time In: 1006  Time Out: 1053      PROGRESS NOTE  Data:  Kaylee Vergara is a 27 y.o. female who presents today for individual therapy session at [facility] with EDGAR Vargas LCSW.       Clinical Maneuvering/Intervention: Kaylee is pleasant alert and oriented to person place and time.  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  The job will be in [facility], and she is hopeful that this will help their financial situation.  She and her  are working to work out the issues in their marriage.  They are still working with the marriage counselor, Torrey.  He has helped them with communication techniques and she feels that this has been helpful.  She is having difficulty sleeping, waking up between 1 AM and 4 AM at night.  She states her mind is racing and she has panic attacks.    Assisted patient in processing above session content; acknowledged and normalized patient’s thoughts, feelings, and concerns. Rationalized patient thought process regarding good sleep hygiene.  Discussed having a quiet time before bed; not having screen time before bed, and  going to bed at the same time each evening.  Discussed triggers associated with patient's anxiety. Also discussed coping skills for patient to implement such as guided imagery as a use of relaxation before bed and when she wakes up in the middle of the night.  Does not discuss journaling as a means of helping with anxiety, and writing down thoughts of gratitude.    Allowed patient to freely discuss issues without interruption or judgment. Provided safe, confidential environment to facilitate the development of positive therapeutic relationship and encourage open, honest communication. Assisted patient in identifying risk factors which would indicate the need for higher level of care including thoughts to harm self or others and/or self-harming behavior and encouraged patient to contact this office,  call 911, or present to the nearest emergency room should any of these events occur. Discussed crisis intervention services and means to access. Patient adamantly and convincingly denies current suicidal or homicidal ideation or perceptual disturbance.    Assessment   Patient appears to maintain relative stability as compared to their baseline. However, patient continues to struggle with depression and anxiety which continues to cause impairment in important areas of functioning. A result, they can be reasonably expected to continue to benefit from treatment and would likely be at increased risk for decompensation otherwise.    Mental Status Exam:   Hygiene:   good  Cooperation:  Cooperative  Eye Contact:  Good  Psychomotor Behavior:  Appropriate  Affect:  Tearful  Mood: depressed and anxious  Speech:  Normal  Thought Process:  Goal directed and Linear  Thought Content:  Normal  Suicidal:  None  Homicidal:  None  Hallucinations:  None  Delusion:  None  Memory:  Intact  Orientation:  Person, Place, Time and Situation  Reliability:  good  Insight:  Good  Judgement:  Good  Impulse Control:  Good  Physical/Medical Issues:  No      PHQ-Score Total:  PHQ-9 Total Score: PHQ-9 Depression Screening  Little interest or pleasure in doing things? 2-->more than half the days   Feeling down, depressed, or hopeless? 2-->more than half the days   Trouble falling or staying asleep, or sleeping too much? 3-->nearly every day   Feeling tired or having little energy? 3-->nearly every day   Poor appetite or overeating? 3-->nearly every day   Feeling bad about yourself - or that you are a failure or have let yourself or your family down? 1-->several days   Trouble concentrating on things, such as reading the newspaper or watching television? 3-->nearly every day   Moving or speaking so slowly that other people could have noticed? Or the opposite - being so fidgety or restless that you have been moving around a lot more than usual?  1-->several days   Thoughts that you would be better off dead, or of hurting yourself in some way? 0-->not at all   PHQ-9 Total Score 18   If you checked off any problems, how difficult have these problems made it for you to do your work, take care of things at home, or get along with other people? somewhat difficult       RODERICK-7 Total Score:   Over the last two weeks, how often have you been bothered by the following problems?  Feeling nervous, anxious or on edge: Nearly every day  Not being able to stop or control worrying: Nearly every day  Worrying too much about different things: Nearly every day  Trouble Relaxing: Nearly every day  Being so restless that it is hard to sit still: Not at all  Becoming easily annoyed or irritable: Nearly every day  Feeling afraid as if something awful might happen: More than half the days  RODERICK 7 Total Score: 17  If you checked any problems, how difficult have these problems made it for you to do your work, take care of things at home, or get along with other people: Very difficult       Patient's Support Network Includes:  parents    Functional Status: Moderate impairment     Progress toward goal: Not at goal    Prognosis: Good with Ongoing Treatment          Plan     Resources: Patient was provided with the following community resources: Apps for relaxation     Patient will continue in individual outpatient therapy with focus on improved functioning and coping skills, maintaining stability, and avoiding decompensation and the need for higher level of care.    Patient will adhere to any medication regimens as prescribed and report any side effects. Patient will contact this office, call 911 or present to the nearest emergency room should suicidal or homicidal ideations occur. Provide cognitive behavioral therapy and solution focused therapy to improve functioning, maintain stability and avoid decompensation and the need for higher level of care.     Return in about 2 weeks, or  earlier if symptoms worsen or fail to improve.           VISIT DIAGNOSIS:     ICD-10-CM ICD-9-CM   1. RODERICK (generalized anxiety disorder)  F41.1 300.02   2. Insomnia due to mental disorder  F51.05 300.9     327.02            This document has been electronically signed by EDGAR Vargas, RJW   December 1, 2022 11:05 EST      Part of this note may be an electronic transcription/translation of spoken language to printed text using the Dragon Dictation System.

## 2022-12-13 ENCOUNTER — OFFICE VISIT (OUTPATIENT)
Dept: PSYCHIATRY | Facility: CLINIC | Age: 27
End: 2022-12-13

## 2022-12-13 VITALS
SYSTOLIC BLOOD PRESSURE: 114 MMHG | HEART RATE: 88 BPM | WEIGHT: 245 LBS | BODY MASS INDEX: 36.18 KG/M2 | DIASTOLIC BLOOD PRESSURE: 66 MMHG

## 2022-12-13 DIAGNOSIS — F90.0 ATTENTION DEFICIT HYPERACTIVITY DISORDER (ADHD), PREDOMINANTLY INATTENTIVE TYPE: Chronic | ICD-10-CM

## 2022-12-13 DIAGNOSIS — F31.30 BIPOLAR I DISORDER, MOST RECENT EPISODE DEPRESSED: Primary | Chronic | ICD-10-CM

## 2022-12-13 DIAGNOSIS — F41.1 GAD (GENERALIZED ANXIETY DISORDER): Chronic | ICD-10-CM

## 2022-12-13 PROCEDURE — 99214 OFFICE O/P EST MOD 30 MIN: CPT

## 2022-12-13 NOTE — PROGRESS NOTES
"Subjective   Kaylee Vergara is a 27 y.o. female who presents today for follow up for psychiatric medication management.     Chief Complaint:  \"The hydroxyzine makes me sleepy during the day, taking it at night and helping.\"    History of Present Illness:     12/13/22: Patient says she can't tell she is taking the straterra. She says she maybe can tell a slight difference in her mood being improved, but she can't tell any difference in her focus and concentration.     She is taking lamotrigine 200mg daily, trazodone 50mg 2 tablets at night, hydroxyzine 25 mg at night and caripiprazine 3mg daily.     She is going to counseling.     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     She believes her medications for mood are working well, but she would like to change or add something to help with ADHD.     The following portions of the patient's history were reviewed and updated as appropriate: allergies, current medications, past family history, past medical history, past social history, past surgical history and problem list.    PAST PSYCHIATRIC HISTORY  Axis I  Generalized anxiety disorder  Bipolar disorder  ADHD  Axis II  None    PAST OUTPATIENT TREATMENT  Diagnosis treated:  Bipolar Disorder  Generalized anxiety disorder  Postpartum depression    Treatment Type:  Medication Management  Genesight testing done Jan 2022, normal converter of folic acid.  Prior Psychiatric Medications:  Wellbutrin - worse anxiety.  Zoloft - sedating all the time  Effexor - inefficacy up to 150mg.  Abilify - inefficacy up to 15mg.  Buspirone - up to 10mg BID w/o efficacy, helpful at 15 mg BID.  Latuda - cost prohibitive.  Lamictal - helpful  Vraylar-effective  Pristiq-ineffective at 25 mg.  Trazodone-effective for sleep at 50 mg.  Support Groups:  None   Sequelae Of Mental Disorder:  family disruption, emotional distress    Interval History  Deteriorated    Side Effects  No side effects noted. "       Past Medical History:  Past Medical History:   Diagnosis Date   • Anxiety    • Depression        Social History:  Social History     Socioeconomic History   • Marital status:    Tobacco Use   • Smoking status: Never   • Smokeless tobacco: Never   Vaping Use   • Vaping Use: Never used   Substance and Sexual Activity   • Alcohol use: No     Comment: socially   • Drug use: Yes     Frequency: 1.0 times per week     Types: Marijuana     Comment: smokes cannabis weekly.       Family History:  History reviewed. No pertinent family history.    Past Surgical History:  Past Surgical History:   Procedure Laterality Date   • KNEE SURGERY      BOTH SIDES       Problem List:  Patient Active Problem List   Diagnosis   • RODERICK (generalized anxiety disorder)   • Bipolar I disorder, most recent episode depressed ([facility])   • Attention deficit hyperactivity disorder   • Insomnia due to mental disorder   • Obesity with body mass index 30 or greater   • Severe episode of recurrent major depressive disorder, without psychotic features ([facility])       Allergy:   Allergies   Allergen Reactions   • Biaxin [Clarithromycin]    • Ceclor [Cefaclor]         Discontinued Medications:  Medications Discontinued During This Encounter   Medication Reason   • busPIRone (BUSPAR) 15 MG tablet *Therapy completed       Current Medications:   Current Outpatient Medications   Medication Sig Dispense Refill   • atomoxetine (Strattera) 40 MG capsule Take 1 capsule by mouth Daily. In the morning 30 capsule 1   • Cariprazine HCl (Vraylar) 3 MG capsule capsule Take 1 capsule by mouth Every Morning. 30 capsule 2   • hydrOXYzine (ATARAX) 25 MG tablet Take 1 tablet by mouth 2 (Two) Times a Day As Needed for Anxiety. 60 tablet 1   • lamoTRIgine (LaMICtal) 200 MG tablet Take 1 tablet by mouth Daily. 90 tablet 1   • traZODone (DESYREL) 50 MG tablet Take 2 tablets by mouth Every Night. 30 tablet 2   • lisdexamfetamine (Vyvanse) 20 MG capsule Take 1 capsule by mouth  Every Morning 30 capsule 0   • ondansetron ODT (ZOFRAN-ODT) 4 MG disintegrating tablet Place 1 tablet on the tongue Every 8 (Eight) Hours As Needed for Nausea or Vomiting. 15 tablet 0     No current facility-administered medications for this visit.         Psychological ROS: positive for - anxiety, concentration difficulties, depression, irritability and sleep disturbances  negative for - behavioral disorder, hallucinations, hostility or mood swings      Physical Exam:   Blood pressure 114/66, pulse 88, weight 111 kg (245 lb).    Mental Status Exam: MSE from 11/17/22 reviewed and accepted with changes.   Hygiene:   good  Cooperation:  Cooperative  Eye Contact:  Good  Psychomotor Behavior:  Appropriate  Affect:  Appropriate  Mood: euthymic  Speech:  Normal  Thought Process:  Goal directed and Linear  Thought Content:  Normal  Suicidal:  None  Homicidal:  None  Hallucinations:  None  Delusion:  None  Memory:  Intact  Orientation:  Person, Place, Time and Situation  Reliability:  good  Insight:  Good  Judgement:  Good  Impulse Control:  Good  Physical/Medical Issues:  No        PHQ-9 Depression Screening    Little interest or pleasure in doing things? 1-->several days   Feeling down, depressed, or hopeless? 1-->several days   Trouble falling or staying asleep, or sleeping too much? 1-->several days   Feeling tired or having little energy? 1-->several days   Poor appetite or overeating? 0-->not at all   Feeling bad about yourself - or that you are a failure or have let yourself or your family down? 0-->not at all   Trouble concentrating on things, such as reading the newspaper or watching television? 2-->more than half the days   Moving or speaking so slowly that other people could have noticed? Or the opposite - being so fidgety or restless that you have been moving around a lot more than usual? 0-->not at all   Thoughts that you would be better off dead, or of hurting yourself in some way? 0-->not at all   PHQ-9 Total  Score 6   If you checked off any problems, how difficult have these problems made it for you to do your work, take care of things at home, or get along with other people? somewhat difficult            Never smoker    I advised Kaylee of the risks of tobacco use.     Result Review:  I have personally reviewed the results from the 1/17/22 and agree with these findings:  [x]  Laboratory  []  Microbiology  []  Radiology  []  EKG/Telemetry   []  Cardiology/Vascular   []  Pathology  []  Old records  []  Other:  Most notable findings include: No concerning laboratory findings.     Assessment & Plan   Diagnoses and all orders for this visit:    1. Bipolar I disorder, most recent episode depressed (HCC) (Primary)    2. Attention deficit hyperactivity disorder (ADHD), predominantly inattentive type  -     lisdexamfetamine (Vyvanse) 20 MG capsule; Take 1 capsule by mouth Every Morning  Dispense: 30 capsule; Refill: 0  -     G-CON Urine Drug Screen -; Future  -     G-CON Urine Drug Screen -    3. RODERICK (generalized anxiety disorder)    Continue lamotrigine 200mg daily.  Continue trazodone 50mg 2 tablets at night.  Continue hydroxyzine 25 mg at night.  Continue caripiprazine 3mg daily.   Start vyvanse 20mg daily.       Visit Diagnoses:    ICD-10-CM ICD-9-CM   1. Bipolar I disorder, most recent episode depressed (HCC)  F31.30 296.50   2. Attention deficit hyperactivity disorder (ADHD), predominantly inattentive type  F90.0 314.00   3. RODERICK (generalized anxiety disorder)  F41.1 300.02       TREATMENT PLAN/GOALS: Continue supportive psychotherapy efforts and medications as indicated. Treatment and medication options discussed during today's visit. Patient ackowledged and verbally consented to continue with current treatment plan and was educated on the importance of compliance with treatment and follow-up appointments.    MEDICATION ISSUES:  INSPECT reviewed as expected. No controlled medications. Ordering Vyvanse today, so will  order baseline UDS.     Start Straterra 40mg in the morning to help with focus and attention.   Stop taking Pristiq as patient does not see any benefit at this time.   Increase trazodone from 50mg nightly to 100mg nightly to improve sleep.   Continue Vraylar 3mg and lamotrigine 200mg daily for mood stabilization.   Start hydroxyzine 25mg BID PRN for anxiety.     Discussed medication options and treatment plan of prescribed medication as well as the risks, benefits, and side effects including potential falls, possible impaired driving and metabolic adversities among others. Patient is agreeable to call the office with any worsening of symptoms or onset of side effects. Patient is agreeable to call 911 or go to the nearest ER should he/she begin having SI/HI. No medication side effects or related complaints today.     MEDS ORDERED DURING VISIT:  New Medications Ordered This Visit   Medications   • lisdexamfetamine (Vyvanse) 20 MG capsule     Sig: Take 1 capsule by mouth Every Morning     Dispense:  30 capsule     Refill:  0       Return in about 2 months (around 2/13/2023).         This document has been electronically signed by ELE Shelton  December 13, 2022 15:05 EST    Part of this note may be an electronic transcription/translation of spoken language to printed text using the Dragon Dictation System.

## 2022-12-15 ENCOUNTER — PRIOR AUTHORIZATION (OUTPATIENT)
Dept: PSYCHIATRY | Facility: CLINIC | Age: 27
End: 2022-12-15

## 2022-12-15 ENCOUNTER — TELEPHONE (OUTPATIENT)
Dept: PSYCHIATRY | Facility: CLINIC | Age: 27
End: 2022-12-15

## 2022-12-15 ENCOUNTER — TELEMEDICINE (OUTPATIENT)
Dept: PSYCHIATRY | Facility: CLINIC | Age: 27
End: 2022-12-15

## 2022-12-15 DIAGNOSIS — F90.0 ATTENTION DEFICIT HYPERACTIVITY DISORDER (ADHD), PREDOMINANTLY INATTENTIVE TYPE: Primary | ICD-10-CM

## 2022-12-15 DIAGNOSIS — F41.1 GAD (GENERALIZED ANXIETY DISORDER): Primary | Chronic | ICD-10-CM

## 2022-12-15 PROCEDURE — 90834 PSYTX W PT 45 MINUTES: CPT | Performed by: SOCIAL WORKER

## 2022-12-15 RX ORDER — DEXTROAMPHETAMINE SACCHARATE, AMPHETAMINE ASPARTATE MONOHYDRATE, DEXTROAMPHETAMINE SULFATE AND AMPHETAMINE SULFATE 5; 5; 5; 5 MG/1; MG/1; MG/1; MG/1
20 CAPSULE, EXTENDED RELEASE ORAL EVERY MORNING
Qty: 30 CAPSULE | Refills: 0 | Status: SHIPPED | OUTPATIENT
Start: 2022-12-15 | End: 2023-01-04 | Stop reason: SDUPTHER

## 2022-12-15 NOTE — PROGRESS NOTES
Date: December 15, 2022  Time In: 1532  Time Out: 1624      PROGRESS NOTE  Data:  Kaylee Vergara is a 27 y.o. female who presents today for individual therapy session at [facility] with EDGAR Vargas LCSW.       Clinical Maneuvering/Intervention: Kaylee is pleasant alert and oriented to person place and time.  She spoke about the feelings that she has related to her 's affair and the heightened anxiety that she continues to feel.  She also talked about her own negative feelings about herself.  They aired do to see their counselor Torrey week of the 27th.    (Scales based on 0 - 10 with 10 being the worst)  Depression: 6 Anxiety: 8       Assisted patient in processing above session content; acknowledged and normalized patient’s thoughts, feelings, and concerns. Rationalized patient thought process regarding negative feelings about herself. Discussed triggers associated with patient's anxiety. Also discussed coping skills for patient to implement such as continuing the skills that she is using, adding relaxation apps on her phone monitoring her self talk, and verbalizing gratefulness for what she sees in her  her home and herself..    Allowed patient to freely discuss issues without interruption or judgment. Provided safe, confidential environment to facilitate the development of positive therapeutic relationship and encourage open, honest communication. Assisted patient in identifying risk factors which would indicate the need for higher level of care including thoughts to harm self or others and/or self-harming behavior and encouraged patient to contact this office, call 911, or present to the nearest emergency room should any of these events occur. Discussed crisis intervention services and means to access. Patient adamantly and convincingly denies current suicidal or homicidal ideation or perceptual disturbance.    Assessment   Patient appears to maintain relative stability as  compared to their baseline. However, patient continues to struggle with anxiety and depression which continues to cause impairment in important areas of functioning. A result, they can be reasonably expected to continue to benefit from treatment and would likely be at increased risk for decompensation otherwise.    Mental Status Exam:   Hygiene:   good  Cooperation:  Cooperative  Eye Contact:  Good  Psychomotor Behavior:  Appropriate  Affect:  Appropriate  Mood: depressed and anxious  Speech:  Normal  Thought Process:  Goal directed and Linear  Thought Content:  Normal  Suicidal:  None  Homicidal:  None  Hallucinations:  None  Delusion:  None  Memory:  Intact  Orientation:  Person, Place, Time and Situation  Reliability:  good  Insight:  Good  Judgement:  Good  Impulse Control:  Good  Physical/Medical Issues:  No        Patient's Support Network Includes:  parents    Functional Status: Moderate impairment     Progress toward goal: Not at goal    Prognosis: Good with Ongoing Treatment          Plan     Resources: Patient was provided with the following community resources: relaxation apps for phone    Patient will continue in individual outpatient therapy with focus on improved functioning and coping skills, maintaining stability, and avoiding decompensation and the need for higher level of care.    Patient will adhere to any medication regimens as prescribed and report any side effects. Patient will contact this office, call 911 or present to the nearest emergency room should suicidal or homicidal ideations occur. Provide cognitive behavioral therapy and solution focused therapy to improve functioning, maintain stability and avoid decompensation and the need for higher level of care.     Return in about 3 weeks, or earlier if symptoms worsen or fail to improve.           VISIT DIAGNOSIS:     ICD-10-CM ICD-9-CM   1. RODERICK (generalized anxiety disorder)  F41.1 300.02            This document has been electronically  signed by EDGAR Vargas, RJW   December 15, 2022 16:37 EST      Part of this note may be an electronic transcription/translation of spoken language to printed text using the Dragon Dictation System.

## 2022-12-15 NOTE — TELEPHONE ENCOUNTER
The PA on Vyvanse was denied.  Please send in something else' insurance wants Adderall, Strattera, Dexmethylphenidate, Guanfacine, or Methylphenidate

## 2022-12-16 NOTE — TELEPHONE ENCOUNTER
Spoke with patient. [facility] has notified her they have some on order but not in stock. They will notify her when it comes in.  I told her if they do not call her by Tuesday to call other pharmacies and find some and call and let me know so we can send it there.

## 2022-12-21 ENCOUNTER — APPOINTMENT (OUTPATIENT)
Dept: GENERAL RADIOLOGY | Facility: HOSPITAL | Age: 27
End: 2022-12-21

## 2022-12-21 ENCOUNTER — HOSPITAL ENCOUNTER (OUTPATIENT)
Facility: HOSPITAL | Age: 27
Discharge: HOME | End: 2022-12-21
Attending: EMERGENCY MEDICINE

## 2022-12-21 VITALS
BODY MASS INDEX: 37.01 KG/M2 | TEMPERATURE: 97.9 F | RESPIRATION RATE: 18 BRPM | HEIGHT: 69 IN | DIASTOLIC BLOOD PRESSURE: 83 MMHG | OXYGEN SATURATION: 98 % | SYSTOLIC BLOOD PRESSURE: 135 MMHG | WEIGHT: 249.9 LBS | HEART RATE: 103 BPM

## 2022-12-21 DIAGNOSIS — M25.461 EFFUSION OF RIGHT KNEE: Primary | ICD-10-CM

## 2022-12-21 DIAGNOSIS — S83.101A SUBLUXATION OF RIGHT KNEE, INITIAL ENCOUNTER: ICD-10-CM

## 2022-12-21 PROCEDURE — G0463 HOSPITAL OUTPT CLINIC VISIT: HCPCS | Performed by: NURSE PRACTITIONER

## 2022-12-21 PROCEDURE — EDLOS: Performed by: NURSE PRACTITIONER

## 2022-12-21 PROCEDURE — 73562 X-RAY EXAM OF KNEE 3: CPT

## 2022-12-21 PROCEDURE — 99213 OFFICE O/P EST LOW 20 MIN: CPT | Performed by: NURSE PRACTITIONER

## 2022-12-21 PROCEDURE — 73562 X-RAY EXAM OF KNEE 3: CPT | Performed by: NURSE PRACTITIONER

## 2022-12-21 RX ORDER — PREDNISONE 20 MG/1
20 TABLET ORAL DAILY
Qty: 5 TABLET | Refills: 0 | Status: SHIPPED | OUTPATIENT
Start: 2022-12-21 | End: 2022-12-26

## 2022-12-21 NOTE — DISCHARGE INSTRUCTIONS
Thank you for letting us care for you today.  Your images reveal that you have a large effusion as well of subluxation of the right knee.  Please call orthopedic and sports medicine in [place] today.  You have been prescribed oral steroids please take these with food.  You may have Tylenol for any aches or pain.  Please purchase a knee immobilizer/brace from a store like [employer].  Use following package instructions.

## 2022-12-21 NOTE — FSED PROVIDER NOTE
" EMERGENCY DEPARTMENT ENCOUNTER      Room Number: 10/10    History is provided by the patient, no translation services needed    HPI:    Chief complaint: Knee pain    Location: Right knee  Quality/Severity: Moderate pain.  Stabbing sensation.    Timing/Duration: Onset was 2 days prior but has worsened since last evening.    Modifying Factors: Made better with rest, made worse with palpation, ambulation, extension of her right foot    Associated Symptoms: Patient feels like her knee may give out at times    Narrative: Pt is a 27 y.o. female who presents complaining of approximately 2 days of right knee pain.  She describes it as a ache with intermittent stabbing sensation and tingling.  She states she has always had \" bad knees\" but has not experienced the symptoms in several years.  She denies any trauma, trips, falls, unusual pushing or pulling.  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  She denies weakness in her leg or foot, cool sensation, pallor.       PMD: Provider, No Known    REVIEW OF SYSTEMS  Review of Systems   Constitutional: Negative for activity change, chills, fatigue and fever.   Respiratory: Negative for shortness of breath.    Cardiovascular: Negative for chest pain, palpitations and leg swelling.   Musculoskeletal: Positive for arthralgias and joint swelling. Negative for back pain, gait problem, myalgias, neck pain and neck stiffness.        Right knee   Skin: Negative for color change, pallor, rash and wound.   Neurological: Negative for dizziness, tremors, syncope, weakness and numbness.   Hematological: Negative for adenopathy. Does not bruise/bleed easily.   Psychiatric/Behavioral: Negative for agitation and behavioral problems. The patient is not nervous/anxious.          PAST MEDICAL HISTORY  Active Ambulatory Problems     Diagnosis Date Noted   • RODERICK (generalized anxiety disorder) 01/17/2022   • Bipolar I disorder, most recent " episode depressed ([facility]) 01/17/2022   • Attention deficit hyperactivity disorder 01/17/2022   • Insomnia due to mental disorder 06/09/2022   • Obesity with body mass index 30 or greater 06/27/2019   • Severe episode of recurrent major depressive disorder, without psychotic features ([facility]) 11/17/2022     Resolved Ambulatory Problems     Diagnosis Date Noted   • No Resolved Ambulatory Problems     Past Medical History:   Diagnosis Date   • Anxiety    • Depression        PAST SURGICAL HISTORY  Past Surgical History:   Procedure Laterality Date   • KNEE SURGERY      BOTH SIDES       FAMILY HISTORY  History reviewed. No pertinent family history.    SOCIAL HISTORY  Social History     Socioeconomic History   • Marital status:    Tobacco Use   • Smoking status: Never   • Smokeless tobacco: Never   Vaping Use   • Vaping Use: Never used   Substance and Sexual Activity   • Alcohol use: No     Comment: socially   • Drug use: Yes     Frequency: 1.0 times per week     Types: Marijuana     Comment: smokes cannabis weekly.       ALLERGIES  Biaxin [clarithromycin] and Ceclor [cefaclor]    No current facility-administered medications for this encounter.    Current Outpatient Medications:   •  amphetamine-dextroamphetamine XR (Adderall XR) 20 MG 24 hr capsule, Take 1 capsule by mouth Every Morning, Disp: 30 capsule, Rfl: 0  •  atomoxetine (Strattera) 40 MG capsule, Take 1 capsule by mouth Daily. In the morning, Disp: 30 capsule, Rfl: 1  •  Cariprazine HCl (Vraylar) 3 MG capsule capsule, Take 1 capsule by mouth Every Morning., Disp: 30 capsule, Rfl: 2  •  hydrOXYzine (ATARAX) 25 MG tablet, Take 1 tablet by mouth 2 (Two) Times a Day As Needed for Anxiety., Disp: 60 tablet, Rfl: 1  •  lamoTRIgine (LaMICtal) 200 MG tablet, Take 1 tablet by mouth Daily., Disp: 90 tablet, Rfl: 1  •  ondansetron ODT (ZOFRAN-ODT) 4 MG disintegrating tablet, Place 1 tablet on the tongue Every 8 (Eight) Hours As Needed for Nausea or Vomiting., Disp: 15  tablet, Rfl: 0  •  predniSONE (DELTASONE) 20 MG tablet, Take 1 tablet by mouth Daily for 5 days. Take with food, Disp: 5 tablet, Rfl: 0  •  traZODone (DESYREL) 50 MG tablet, Take 2 tablets by mouth Every Night., Disp: 30 tablet, Rfl: 2    PHYSICAL EXAM  ED Triage Vitals [12/21/22 0902]   Temp Heart Rate Resp BP SpO2   97.9 °F (36.6 °C) 103 18 135/83 98 %      Temp src Heart Rate Source Patient Position BP Location FiO2 (%)   Oral Monitor Sitting Left arm --       Physical Exam  Vitals and nursing note reviewed.   Constitutional:       General: She is not in acute distress.     Appearance: Normal appearance. She is not ill-appearing, toxic-appearing or diaphoretic.   HENT:      Head: Normocephalic and atraumatic.      Nose: Nose normal.      Mouth/Throat:      Mouth: Mucous membranes are moist.   Eyes:      Extraocular Movements: Extraocular movements intact.      Conjunctiva/sclera: Conjunctivae normal.   Cardiovascular:      Rate and Rhythm: Normal rate.      Pulses: Normal pulses.   Pulmonary:      Effort: Pulmonary effort is normal.   Musculoskeletal:      Cervical back: Normal range of motion.      Right knee: Swelling present. No bony tenderness. Decreased range of motion. Tenderness present. Normal alignment.      Instability Tests: Anterior drawer test negative. Posterior drawer test negative. Anterior Lachman test negative. Medial Maria T test positive and lateral Maria T test positive.      Left knee: Normal.        Legs:       Comments: Tender on palpation.  Moderate swelling above the knee.  Range of motion limited due to pain.  Sensation is intact.  Strength is intact 5 out of 5.  Pain medial meniscus region noted with extension of right foot.   Neurological:      Mental Status: She is alert.           LAB RESULTS  Lab Results (last 24 hours)     ** No results found for the last 24 hours. **            I ordered the above labs and reviewed the results    RADIOLOGY  XR Knee 3 View Right    Result Date:  12/21/2022  DATE OF EXAM: 12/21/2022 9:19 AM  PROCEDURE: XR KNEE 3 VW RIGHT-  INDICATIONS: right knee pain.  COMPARISON: No Comparisons Available  TECHNIQUE: Three radiologic views of the right knee were obtained.  FINDINGS: The medial and lateral compartments are maintained in alignment. Normal bone mineralization and trabeculation. Negative for fracture. There is a large joint effusion. Patellar is mildly subluxed laterally. There is a small ossicle measuring 6 x 6 mm medial to patella likely in medial patellofemoral ligament related to chronic patellar instability.      1. Mild lateral patellar subluxation with ossicle adjacent to medial patella consistent with acute on chronic patellar instability. 2. Large joint effusion. 3. Negative for fracture.  Electronically Signed By-Rosendo Richardson MD On:12/21/2022 9:40 AM This report was finalized on [id number] by  Rosendo Richardson MD.      I ordered the above radiologic testing and reviewed the results    PROCEDURES  Procedures      PROGRESS AND CONSULTS  ED Course as of 12/21/22 0957   Wed Dec 21, 2022   0945 IMPRESSION:  1. Mild lateral patellar subluxation with ossicle adjacent to medial  patella consistent with acute on chronic patellar instability.  2. Large joint effusion.   3. Negative for fracture.     Electronically Signed By-Rosendo Richardson MD On:12/21/2022 9:40 AM  This report was finalized on [id number] by  Rosendo Richardson MD. [VT]   0946 Image results discussed with patient.  She will be referred to orthopedics for further evaluation and care of this knee. [VT]   0947 We will begin oral steroids.  She may have Tylenol for other discomfort.  She is aware to take her steroids with food.  In addition she is to purchase a knee brace for compression and support. [VT]      ED Course User Index  [VT] Darleen Peterson, ELE           MEDICAL DECISION MAKING    MDM     Patient has been diagnosed with effusion and subluxation of her right knee.  She has been given  referral to orthopedic specialist that she is to call today.  She will begin prednisone for inflammation and use a compressive wrap on her knee.  DIAGNOSIS  Final diagnoses:   Effusion of right knee   Subluxation of right knee, initial encounter       Latest Documented Vital Signs:  As of 09:57 EST  BP- 135/83 HR- 103 Temp- 97.9 °F (36.6 °C) (Oral) O2 sat- 98%    DISPOSITION      Discussed pertinent findings with the patient/family.  Patient/Family voiced understanding of need to follow-up for recheck and further testing as needed.  Return to the Emergency Department warnings were given.         Medication List      New Prescriptions    predniSONE 20 MG tablet  Commonly known as: DELTASONE  Take 1 tablet by mouth Daily for 5 days. Take with food           Where to Get Your Medications      These medications were sent to [facility] [address] [place] [address] - [address] - [phone]  - [phone]   [address] [facility] [place] [address]    Phone: [phone]   · predniSONE 20 MG tablet              Follow-up Information     ORTHOPEDIC & SP[facility]TS MEDICINE - [facility]. Call today.    Contact information:  [address] [place] [address] [facility] [address]  [facility] [id number] [phone]                         Dictated utilizing Dragon dictation

## 2023-01-04 ENCOUNTER — TELEPHONE (OUTPATIENT)
Dept: PSYCHIATRY | Facility: CLINIC | Age: 28
End: 2023-01-04
Payer: COMMERCIAL

## 2023-01-04 DIAGNOSIS — F90.0 ATTENTION DEFICIT HYPERACTIVITY DISORDER (ADHD), PREDOMINANTLY INATTENTIVE TYPE: ICD-10-CM

## 2023-01-04 RX ORDER — DEXTROAMPHETAMINE SACCHARATE, AMPHETAMINE ASPARTATE MONOHYDRATE, DEXTROAMPHETAMINE SULFATE AND AMPHETAMINE SULFATE 5; 5; 5; 5 MG/1; MG/1; MG/1; MG/1
20 CAPSULE, EXTENDED RELEASE ORAL EVERY MORNING
Qty: 30 CAPSULE | Refills: 0 | Status: SHIPPED | OUTPATIENT
Start: 2023-01-04

## 2023-01-04 NOTE — TELEPHONE ENCOUNTER
Patient called, she has been waiting for her pharmacy to get her Adderall and they never have. She called around and found some at the [facility].   Canceled at [facility], Trina/Maria M    Pulled an inspect, she has not picked anything up.

## 2023-01-14 DIAGNOSIS — F90.0 ATTENTION DEFICIT HYPERACTIVITY DISORDER (ADHD), PREDOMINANTLY INATTENTIVE TYPE: Chronic | ICD-10-CM

## 2023-01-19 RX ORDER — ATOMOXETINE 40 MG/1
CAPSULE ORAL
Qty: 90 CAPSULE | Refills: 1 | Status: SHIPPED | OUTPATIENT
Start: 2023-01-19

## 2023-01-19 NOTE — TELEPHONE ENCOUNTER
Rx Refill Note  Requested Prescriptions     Pending Prescriptions Disp Refills   • atomoxetine (STRATTERA) 40 MG capsule [Pharmacy Med Name: Atomoxetine HCl 40 MG Oral Capsule] 30 capsule 0     Sig: Take 1 capsule by mouth once daily in the morning      Last office visit with prescribing clinician: 12/13/2022     Next office visit with prescribing clinician: 2/15/2023     Office Visit with Sharon Matias APRN (12/13/2022)    [facility] Urine Drug Screen - (12/13/2022)    Felisha Dudley  01/19/23, 09:46 EST

## 2023-01-28 DIAGNOSIS — F41.1 GAD (GENERALIZED ANXIETY DISORDER): Chronic | ICD-10-CM

## 2023-01-30 RX ORDER — HYDROXYZINE HYDROCHLORIDE 25 MG/1
TABLET, FILM COATED ORAL
Qty: 60 TABLET | Refills: 0 | Status: SHIPPED | OUTPATIENT
Start: 2023-01-30

## 2023-04-24 ENCOUNTER — OFFICE VISIT (OUTPATIENT)
Dept: PSYCHIATRY | Facility: CLINIC | Age: 28
End: 2023-04-24
Payer: COMMERCIAL

## 2023-04-24 DIAGNOSIS — F41.1 GAD (GENERALIZED ANXIETY DISORDER): Primary | Chronic | ICD-10-CM

## 2023-04-24 DIAGNOSIS — F31.30 BIPOLAR I DISORDER, MOST RECENT EPISODE DEPRESSED: Chronic | ICD-10-CM

## 2023-04-24 DIAGNOSIS — F41.1 GAD (GENERALIZED ANXIETY DISORDER): Chronic | ICD-10-CM

## 2023-04-24 PROCEDURE — 90837 PSYTX W PT 60 MINUTES: CPT | Performed by: SOCIAL WORKER

## 2023-04-24 PROCEDURE — 99214 OFFICE O/P EST MOD 30 MIN: CPT

## 2023-04-24 RX ORDER — LAMOTRIGINE 200 MG/1
200 TABLET ORAL DAILY
Qty: 90 TABLET | Refills: 1 | Status: SHIPPED | OUTPATIENT
Start: 2023-04-24 | End: 2023-04-24

## 2023-04-24 NOTE — PROGRESS NOTES
Date: April 24, 2023  Time In: 0901  Time Out: 0956      PROGRESS NOTE  Data:  Kaylee Vergara is a 28 y.o. female who presents today for individual therapy session at [facility] with EDGAR Vargas LCSW.     Patient Chief Complaint: Follow-up for anxiety and bipolar disorder    Clinical Maneuvering/Intervention: Kaylee is pleasant, alert and oriented to person place and time.  She was last seen December 15, 2022 due to insurance.  She spoke about her relationship with her  and how things have not gotten better.  She described him having narcissistic traits with devaluing her, lying, manipulation, and continuing to hide things.  In addition, he quit his job and bought a truck with a $900 a month payment.  Currently,  basic needs are being met but it has been very difficult.  She verbalized frustration with the situation and not knowing how to navigate through this.     Assisted patient in processing above session content; acknowledged and normalized patient’s thoughts, feelings, and concerns. Rationalized patient thought process regarding communication techniques with individuals with maladaptive behavior. Discussed triggers associated with patient's depression and anxiety. Also discussed coping skills for patient to implement such as continuing the skills already built.    Allowed patient to freely discuss issues without interruption or judgment. Provided safe, confidential environment to facilitate the development of positive therapeutic relationship and encourage open, honest communication. Assisted patient in identifying risk factors which would indicate the need for higher level of care including thoughts to harm self or others and/or self-harming behavior and encouraged patient to contact this office, call 911, or present to the nearest emergency room should any of these events occur. Discussed crisis intervention services and means to access. Patient adamantly and convincingly  denies current suicidal or homicidal ideation or perceptual disturbance.    Assessment   Patient appears to maintain relative stability as compared to their baseline. However, patient continues to struggle with depression and anxiety which continues to cause impairment in important areas of functioning. A result, they can be reasonably expected to continue to benefit from treatment and would likely be at increased risk for decompensation otherwise.    Mental Status Exam:   Hygiene:   good  Cooperation:  Cooperative  Eye Contact:  Good  Psychomotor Behavior:  Appropriate  Affect:  Appropriate and Tearful  Mood: sad and anxious  Speech:  Normal  Thought Process:  Goal directed and Linear  Thought Content:  Normal  Suicidal:  None  Homicidal:  None  Hallucinations:  None  Delusion:  None  Memory:  Intact  Orientation:  Person, Place, Time and Situation  Reliability:  good  Insight:  Good  Judgement:  Good  Impulse Control:  Good  Physical/Medical Issues:  No      PHQ-Score Total: 7/27  RODERICK-7 Total Score: 9/21       Patient's Support Network Includes:  parents    Functional Status: Moderate impairment     Progress toward goal: Not at goal    Prognosis: Good with Ongoing Treatment          Plan     Resources: Patient was provided with the following community resources: None at this time    Patient will continue in individual outpatient therapy with focus on improved functioning and coping skills, maintaining stability, and avoiding decompensation and the need for higher level of care.    Patient will adhere to any medication regimens as prescribed and report any side effects. Patient will contact this office, call 911 or present to the nearest emergency room should suicidal or homicidal ideations occur. Provide cognitive behavioral therapy and solution focused therapy to improve functioning, maintain stability and avoid decompensation and the need for higher level of care.     Return at first to feel or earlier if  symptoms worsen or fail to improve.           VISIT DIAGNOSIS:     ICD-10-CM ICD-9-CM   1. RODERICK (generalized anxiety disorder)  F41.1 300.02   2. Bipolar I disorder, most recent episode depressed  F31.30 296.50            This document has been electronically signed by EDGAR Vargas, RJW   April 24, 2023 11:31 EDT      Part of this note may be an electronic transcription/translation of spoken language to printed text using the Dragon Dictation System.

## 2023-05-08 ENCOUNTER — OFFICE VISIT (OUTPATIENT)
Dept: PSYCHIATRY | Facility: CLINIC | Age: 28
End: 2023-05-08
Payer: COMMERCIAL

## 2023-05-08 DIAGNOSIS — F41.1 GAD (GENERALIZED ANXIETY DISORDER): Chronic | ICD-10-CM

## 2023-05-08 DIAGNOSIS — F33.2 SEVERE EPISODE OF RECURRENT MAJOR DEPRESSIVE DISORDER, WITHOUT PSYCHOTIC FEATURES: Primary | ICD-10-CM

## 2023-05-08 NOTE — PROGRESS NOTES
Date: May 8, 2023  Time In: 1015  Time Out:1110      PROGRESS NOTE  Data:  Kaylee Vergara is a 28 y.o. female who presents today for individual therapy session at [facility] with EDGAR Vargas LCSW.     Patient Chief Complaint: Follow-up for anxiety and bipolar disorder    Clinical Maneuvering/Intervention: Kaylee is pleasant, alert and oriented to person place and time.  She talked about the ongoing relationship she has with her  and of his maladaptive behaviors related to lying, questioning her actions and not taking accountability for his actions.  She also talked about how her mood is worse during her menstrual cycle.  She finds herself dissociating and catching that bring herself back into the moment.    Assisted patient in processing above session content; acknowledged and normalized patient’s thoughts, feelings, and concerns. Rationalized patient thought process regarding boundary setting within a maladaptive relationship. Discussed triggers associated with patient's depression and anxiety. Also discussed coping skills for patient to implement such as continuing the skills built.    Allowed patient to freely discuss issues without interruption or judgment. Provided safe, confidential environment to facilitate the development of positive therapeutic relationship and encourage open, honest communication. Assisted patient in identifying risk factors which would indicate the need for higher level of care including thoughts to harm self or others and/or self-harming behavior and encouraged patient to contact this office, call 911, or present to the nearest emergency room should any of these events occur. Discussed crisis intervention services and means to access. Patient adamantly and convincingly denies current suicidal or homicidal ideation or perceptual disturbance.    Assessment   Patient appears to maintain relative stability as compared to their baseline. However, patient  continues to struggle with depression and anxiety which continues to cause impairment in important areas of functioning. A result, they can be reasonably expected to continue to benefit from treatment and would likely be at increased risk for decompensation otherwise.    Mental Status Exam:   Hygiene:   good  Cooperation:  Cooperative  Eye Contact:  Good  Psychomotor Behavior:  Appropriate  Affect:  Appropriate and Tearful  Mood: sad and anxious  Speech:  Normal  Thought Process:  Goal directed and Linear  Thought Content:  Normal  Suicidal:   She has thoughts that she would be better off dead but has no plans or intentions of self-harm  Homicidal:  None  Hallucinations:  None  Delusion:  None  Memory:  Intact  Orientation:  Person, Place, Time and Situation  Reliability:  good  Insight:  Good  Judgement:  Good  Impulse Control:  Good  Physical/Medical Issues:  No        PHQ-Score Total:  PHQ-9 Total Score: PHQ-9 Depression Screening  Little interest or pleasure in doing things? 1-->several days   Feeling down, depressed, or hopeless? 2-->more than half the days   Trouble falling or staying asleep, or sleeping too much? 3-->nearly every day   Feeling tired or having little energy? 2-->more than half the days   Poor appetite or overeating? 1-->several days   Feeling bad about yourself - or that you are a failure or have let yourself or your family down? 3-->nearly every day   Trouble concentrating on things, such as reading the newspaper or watching television? 1-->several days   Moving or speaking so slowly that other people could have noticed? Or the opposite - being so fidgety or restless that you have been moving around a lot more than usual? 0-->not at all   Thoughts that you would be better off dead, or of hurting yourself in some way? 1-->several days   PHQ-9 Total Score 14   If you checked off any problems, how difficult have these problems made it for you to do your work, take care of things at home, or get  "along with other people?        RODERICK-7 Total Score:   Over the last two weeks, how often have you been bothered by the following problems?  Feeling nervous, anxious or on edge: More than half the days  Not being able to stop or control worrying: Nearly every day  Worrying too much about different things: More than half the days  Trouble Relaxing: Nearly every day  Being so restless that it is hard to sit still: Not at all  Becoming easily annoyed or irritable: More than half the days  Feeling afraid as if something awful might happen: Nearly every day  RODERICK 7 Total Score: 15     Patient's Support Network Includes:  parents     Functional Status: Moderate impairment      Progress toward goal: Not at goal     Prognosis: Good with Ongoing Treatment      Plan     Resources: Patient was provided with the following community resources: Read the book \"emotionally destructive marriage\"    Patient will continue in individual outpatient therapy with focus on improved functioning and coping skills, maintaining stability, and avoiding decompensation and the need for higher level of care.    Patient will adhere to any medication regimens as prescribed and report any side effects. Patient will contact this office, call 911 or present to the nearest emergency room should suicidal or homicidal ideations occur. Provide cognitive behavioral therapy and solution focused therapy to improve functioning, maintain stability and avoid decompensation and the need for higher level of care.     Return in about 4-6 weeks, or earlier if symptoms worsen or fail to improve.           VISIT DIAGNOSIS:     ICD-10-CM ICD-9-CM   1. Severe episode of recurrent major depressive disorder, without psychotic features  F33.2 296.33   2. RODERICK (generalized anxiety disorder)  F41.1 300.02            This document has been electronically signed by EDGAR Vargas, TROY   May 8, 2023 13:50 EDT      Part of this note may be an electronic " transcription/translation of spoken language to printed text using the Dragon Dictation System.

## 2023-06-06 DIAGNOSIS — F41.1 GAD (GENERALIZED ANXIETY DISORDER): Chronic | ICD-10-CM

## 2023-06-06 DIAGNOSIS — F31.30 BIPOLAR I DISORDER, MOST RECENT EPISODE DEPRESSED: Chronic | ICD-10-CM

## 2023-06-06 RX ORDER — CARIPRAZINE 3 MG/1
CAPSULE, GELATIN COATED ORAL
Qty: 30 CAPSULE | Refills: 3 | Status: SHIPPED | OUTPATIENT
Start: 2023-06-06

## 2023-10-05 NOTE — PROGRESS NOTES
Subjective   Kaylee Vergara is a 28 y.o. female who presents today for follow up for psychiatric medication management.     Chief Complaint:  Anxiety, bipolar disorder, ADHD    History of Present Illness:     Medication adjustments last visit:    Continue lamotrigine 200mg daily.  Continue caripiprazine 3mg daily.   Continue straterra 40mg.     Patient states depression is doing ok, but she feels like her ADHD and anxiety are what are really affecting her. We had tried to start her on Adderall last winter, but she tried for 3 months and was never able to get it due to the shortage. She is interested in possibly trying a stimulant again to see if that alleviates some of her symptoms.   She also reports having some increased depression, irritability, and crying spells the week before her menstrual cycle. We discussed trying Vraylar 1.5 mg, in addition to the 3mg dose the week before her period to see if that helped. I will give her samples for the 1.5mg dose.   She denies any suicidal ideation. Denies HI/AVH.     Previous visit:   Currently taking Vraylar 3mg, lamotrigine 200mg and straterra 40mg. She has not been on Adderall because of the shortage, she couldn't find it.   Feels like medications are working well.   Straterra is working well for focus. The Adderall was working, but couldn't find it.   No suicidal thoughts. No HI/AVH  She was in counseling but her insurance changed. She would like to start back with Tana if it is covered under her insurance.   She is still having situational depression due to things going on in her life, but she doesn't think it is anything medication will fix.   Will leave medications the same for now.     Patient presents with symptoms and behaviors that are consistent with the following DSM-5 diagnoses:  Bipolar disorder   2. ADHD    The following portions of the patient's history were reviewed and updated as appropriate: allergies, current medications, past family history, past  medical history, past social history, past surgical history and problem list.    PAST OUTPATIENT TREATMENT  Diagnosis treated:  Bipolar Disorder  Generalized anxiety disorder  Postpartum depression    Treatment Type:  Medication Management  Genesight testing done Jan 2022, normal converter of folic acid.  Prior Psychiatric Medications:  Wellbutrin - worse anxiety.  Zoloft - sedating all the time  Effexor - inefficacy up to 150mg.  Abilify - inefficacy up to 15mg.  Buspirone - up to 10mg BID w/o efficacy, helpful at 15 mg BID.  Latuda - cost prohibitive.  Lamictal - helpful  Vraylar-effective  Pristiq-ineffective at 25 mg.  Trazodone-effective for sleep at 50 mg.  Support Groups:  None   Sequelae Of Mental Disorder:  family disruption, emotional distress    Interval History  Improved    Side Effects  No side effects noted.       Past Medical History:  Past Medical History:   Diagnosis Date    Anxiety     Depression        Social History:  Social History     Socioeconomic History    Marital status:    Tobacco Use    Smoking status: Never    Smokeless tobacco: Never   Vaping Use    Vaping Use: Never used   Substance and Sexual Activity    Alcohol use: No     Comment: socially    Drug use: Yes     Frequency: 1.0 times per week     Types: Marijuana     Comment: smokes cannabis weekly.       Family History:  No family history on file.    Past Surgical History:  Past Surgical History:   Procedure Laterality Date    KNEE SURGERY      BOTH SIDES       Problem List:  Patient Active Problem List   Diagnosis    RODERICK (generalized anxiety disorder)    Bipolar I disorder, most recent episode depressed    Attention deficit hyperactivity disorder    Insomnia due to mental disorder    Obesity with body mass index 30 or greater    Severe episode of recurrent major depressive disorder, without psychotic features       Allergy:   Allergies   Allergen Reactions    Biaxin [Clarithromycin]     Ceclor [Cefaclor]         Discontinued  Medications:  Medications Discontinued During This Encounter   Medication Reason    atomoxetine (STRATTERA) 40 MG capsule Reorder    Cariprazine HCl (Vraylar) 3 MG capsule capsule Reorder         Current Medications:   Current Outpatient Medications   Medication Sig Dispense Refill    atomoxetine (STRATTERA) 40 MG capsule Take 1 capsule by mouth Every Morning. 30 capsule 2    Cariprazine HCl (Vraylar) 3 MG capsule capsule Take 1 capsule by mouth Every Morning. 30 capsule 2    Cariprazine HCl (Vraylar) 1.5 MG capsule capsule Take 1 capsule daily (in addition to 3mg capsule) for one week prior to menstrual cycle. 28 capsule 0    lamoTRIgine (LaMICtal) 200 MG tablet Take 1 tablet by mouth Daily. 30 tablet 2    Serdexmethylphen-Dexmethylphen (azSTARys) 39.2-7.8 MG capsule Take 1 capsule by mouth Every Morning. 30 capsule 0     No current facility-administered medications for this visit.         Psychological ROS: positive for - anxiety, concentration difficulties, depression, irritability and sleep disturbances  negative for - behavioral disorder, hallucinations, hostility or mood swings      Physical Exam:   There were no vitals taken for this visit.    Mental Status Exam:   Hygiene:   good  Cooperation:  Cooperative  Eye Contact:  Good  Psychomotor Behavior:  Appropriate  Affect:  Appropriate  Mood: euthymic  Speech:  Normal  Thought Process:  Goal directed and Linear  Thought Content:  Normal  Suicidal:  None  Homicidal:  None  Hallucinations:  None  Delusion:  None  Memory:  Intact  Orientation:  Person, Place, Time and Situation  Reliability:  good  Insight:  Good  Judgement:  Good  Impulse Control:  Good  Physical/Medical Issues:  No      Mental status exam was reviewed and compared to visit on 4/24/23 and appropriate updates were made.     PHQ-9 Depression Screening    Little interest or pleasure in doing things? 1-->several days   Feeling down, depressed, or hopeless? 2-->more than half the days   Trouble  falling or staying asleep, or sleeping too much? 3-->nearly every day   Feeling tired or having little energy? 3-->nearly every day   Poor appetite or overeating? 3-->nearly every day   Feeling bad about yourself - or that you are a failure or have let yourself or your family down? 3-->nearly every day   Trouble concentrating on things, such as reading the newspaper or watching television? 3-->nearly every day   Moving or speaking so slowly that other people could have noticed? Or the opposite - being so fidgety or restless that you have been moving around a lot more than usual? 3-->nearly every day   Thoughts that you would be better off dead, or of hurting yourself in some way? 1-->several days   PHQ-9 Total Score 22   If you checked off any problems, how difficult have these problems made it for you to do your work, take care of things at home, or get along with other people? extremely difficult      Never smoker    I advised Kaylee of the risks of tobacco use.     Assessment & Plan   Diagnoses and all orders for this visit:    1. Bipolar I disorder, most recent episode depressed (Primary)  -     lamoTRIgine (LaMICtal) 200 MG tablet; Take 1 tablet by mouth Daily.  Dispense: 30 tablet; Refill: 2  -     Cariprazine HCl (Vraylar) 3 MG capsule capsule; Take 1 capsule by mouth Every Morning.  Dispense: 30 capsule; Refill: 2    2. RODERICK (generalized anxiety disorder)  -     Cariprazine HCl (Vraylar) 3 MG capsule capsule; Take 1 capsule by mouth Every Morning.  Dispense: 30 capsule; Refill: 2    3. Attention deficit hyperactivity disorder (ADHD), predominantly inattentive type  -     Serdexmethylphen-Dexmethylphen (azSTARys) 39.2-7.8 MG capsule; Take 1 capsule by mouth Every Morning.  Dispense: 30 capsule; Refill: 0  -     atomoxetine (STRATTERA) 40 MG capsule; Take 1 capsule by mouth Every Morning.  Dispense: 30 capsule; Refill: 2    Other orders  -     Cariprazine HCl (Vraylar) 1.5 MG capsule capsule; Take 1 capsule  daily (in addition to 3mg capsule) for one week prior to menstrual cycle.  Dispense: 28 capsule; Refill: 0      Continue lamotrigine 200mg daily.  Continue caripiprazine 3mg daily. Add Vraylar 1.5mg the week before her menstrual cycle.   Continue straterra 40mg.   Start Azstarys 39.2/7.8 once daily in the morning.       Visit Diagnoses:    ICD-10-CM ICD-9-CM   1. Bipolar I disorder, most recent episode depressed  F31.30 296.50   2. RODERICK (generalized anxiety disorder)  F41.1 300.02   3. Attention deficit hyperactivity disorder (ADHD), predominantly inattentive type  F90.0 314.00         TREATMENT PLAN/GOALS: Continue supportive psychotherapy efforts and medications as indicated. Treatment and medication options discussed during today's visit. Patient ackowledged and verbally consented to continue with current treatment plan and was educated on the importance of compliance with treatment and follow-up appointments.    MEDICATION ISSUES:  INSPECT reviewed as expected.     Discussed medication options and treatment plan of prescribed medication as well as the risks, benefits, and side effects including potential falls, possible impaired driving and metabolic adversities among others. Patient is agreeable to call the office with any worsening of symptoms or onset of side effects. Patient is agreeable to call 911 or go to the nearest ER should he/she begin having SI/HI. No medication side effects or related complaints today.     MEDS ORDERED DURING VISIT:  New Medications Ordered This Visit   Medications    Serdexmethylphen-Dexmethylphen (azSTARys) 39.2-7.8 MG capsule     Sig: Take 1 capsule by mouth Every Morning.     Dispense:  30 capsule     Refill:  0    lamoTRIgine (LaMICtal) 200 MG tablet     Sig: Take 1 tablet by mouth Daily.     Dispense:  30 tablet     Refill:  2    Cariprazine HCl (Vraylar) 3 MG capsule capsule     Sig: Take 1 capsule by mouth Every Morning.     Dispense:  30 capsule     Refill:  2    atomoxetine  (STRATTERA) 40 MG capsule     Sig: Take 1 capsule by mouth Every Morning.     Dispense:  30 capsule     Refill:  2    Cariprazine HCl (Vraylar) 1.5 MG capsule capsule     Sig: Take 1 capsule daily (in addition to 3mg capsule) for one week prior to menstrual cycle.     Dispense:  28 capsule     Refill:  0     Gave patient 1.5mg samples to take one week of the month, the week before her period.       No follow-ups on file.         This document has been electronically signed by ELE Shelton  October 9, 2023 08:43 EDT    Part of this note may be an electronic transcription/translation of spoken language to printed text using the Dragon Dictation System.

## 2023-10-09 ENCOUNTER — OFFICE VISIT (OUTPATIENT)
Dept: PSYCHIATRY | Facility: CLINIC | Age: 28
End: 2023-10-09
Payer: COMMERCIAL

## 2023-10-09 DIAGNOSIS — F90.0 ATTENTION DEFICIT HYPERACTIVITY DISORDER (ADHD), PREDOMINANTLY INATTENTIVE TYPE: Chronic | ICD-10-CM

## 2023-10-09 DIAGNOSIS — F41.1 GAD (GENERALIZED ANXIETY DISORDER): Chronic | ICD-10-CM

## 2023-10-09 DIAGNOSIS — F31.30 BIPOLAR I DISORDER, MOST RECENT EPISODE DEPRESSED: Primary | Chronic | ICD-10-CM

## 2023-10-09 PROCEDURE — 99214 OFFICE O/P EST MOD 30 MIN: CPT

## 2023-10-09 RX ORDER — SERDEXMETHYLPHENIDATE AND DEXMETHYLPHENIDATE 7.8; 39.2 MG/1; MG/1
1 CAPSULE ORAL EVERY MORNING
Qty: 30 CAPSULE | Refills: 0 | Status: SHIPPED | OUTPATIENT
Start: 2023-10-09

## 2023-10-09 RX ORDER — ATOMOXETINE 40 MG/1
40 CAPSULE ORAL EVERY MORNING
Qty: 30 CAPSULE | Refills: 2 | Status: SHIPPED | OUTPATIENT
Start: 2023-10-09

## 2023-10-09 RX ORDER — LAMOTRIGINE 200 MG/1
200 TABLET ORAL DAILY
Qty: 30 TABLET | Refills: 2 | Status: SHIPPED | OUTPATIENT
Start: 2023-10-09 | End: 2024-10-08

## 2023-11-02 DIAGNOSIS — F90.0 ATTENTION DEFICIT HYPERACTIVITY DISORDER (ADHD), PREDOMINANTLY INATTENTIVE TYPE: Chronic | ICD-10-CM

## 2023-11-02 RX ORDER — SERDEXMETHYLPHENIDATE AND DEXMETHYLPHENIDATE 7.8; 39.2 MG/1; MG/1
1 CAPSULE ORAL EVERY MORNING
Qty: 30 CAPSULE | Refills: 0 | Status: SHIPPED | OUTPATIENT
Start: 2023-11-02

## 2023-11-02 NOTE — TELEPHONE ENCOUNTER
Rx Refill Note  Requested Prescriptions     Pending Prescriptions Disp Refills    Serdexmethylphen-Dexmethylphen (azSTARys) 39.2-7.8 MG capsule 30 capsule 0     Sig: Take 1 capsule by mouth Every Morning.        Last office visit with prescribing clinician: 10/9/2023     Next office visit with prescribing clinician: 12/4/2023     Office Visit with Sharon aMtias APRN (10/09/2023)     [facility] Urine Drug Screen - (12/13/2022)     Inspect Fill 10/9/23    Felisha Dudley  11/02/23, 15:00 EDT

## 2023-11-06 ENCOUNTER — PRIOR AUTHORIZATION (OUTPATIENT)
Dept: PSYCHIATRY | Facility: CLINIC | Age: 28
End: 2023-11-06
Payer: COMMERCIAL

## 2023-12-07 DIAGNOSIS — F90.0 ATTENTION DEFICIT HYPERACTIVITY DISORDER (ADHD), PREDOMINANTLY INATTENTIVE TYPE: Chronic | ICD-10-CM

## 2023-12-07 RX ORDER — SERDEXMETHYLPHENIDATE AND DEXMETHYLPHENIDATE 7.8; 39.2 MG/1; MG/1
1 CAPSULE ORAL EVERY MORNING
Qty: 30 CAPSULE | Refills: 0 | Status: SHIPPED | OUTPATIENT
Start: 2023-12-07

## 2023-12-07 NOTE — TELEPHONE ENCOUNTER
Rx Refill Note  Requested Prescriptions     Pending Prescriptions Disp Refills    Serdexmethylphen-Dexmethylphen (azSTARys) 39.2-7.8 MG capsule 30 capsule 0     Sig: Take 1 capsule by mouth Every Morning.        Last office visit with prescribing clinician: 10/9/2023     Next office visit with prescribing clinician: 1/11/2024     Office Visit with Sharon Matias APRN (10/09/2023)     [facility] Urine Drug Screen - (12/13/2022)     Inspect Fill 11/8/23    Felisha Dudley  12/07/23, 11:15 EST   
Spoke with patient. Stated verbal understanding.  
66
No

## 2024-01-05 DIAGNOSIS — F90.0 ATTENTION DEFICIT HYPERACTIVITY DISORDER (ADHD), PREDOMINANTLY INATTENTIVE TYPE: Chronic | ICD-10-CM

## 2024-01-05 RX ORDER — SERDEXMETHYLPHENIDATE AND DEXMETHYLPHENIDATE 7.8; 39.2 MG/1; MG/1
1 CAPSULE ORAL EVERY MORNING
Qty: 30 CAPSULE | Refills: 0 | Status: SHIPPED | OUTPATIENT
Start: 2024-01-05

## 2024-01-05 NOTE — TELEPHONE ENCOUNTER
Rx Refill Note  Requested Prescriptions     Pending Prescriptions Disp Refills    Serdexmethylphen-Dexmethylphen (azSTARys) 39.2-7.8 MG capsule 30 capsule 0     Sig: Take 1 capsule by mouth Every Morning.        Last office visit with prescribing clinician: 10/9/2023     Next office visit with prescribing clinician: 1/11/2024     Office Visit with Sharon Matias APRN (10/09/2023)     [facility] Urine Drug Screen - (12/13/2022)     Inspect Fill 12/8/23    Felisha Dudley  01/05/24, 11:55 EST

## 2024-01-10 NOTE — PROGRESS NOTES
Subjective   Kaylee Vergara is a 28 y.o. female who presents today for follow up for psychiatric medication management.     Chief Complaint:  Anxiety, bipolar disorder, ADHD    History of Present Illness:     Medication adjustments last visit:   Continue lamotrigine 200mg daily.  Continue caripiprazine 3mg daily. Add Vraylar 1.5mg the week before her menstrual cycle.   Continue straterra 40mg.   Start Azstarys 39.2/7.8 once daily in the morning.     Patient states she is not doing well today, but reports that there are a lot of circumstantial things going on that are interfering.   Her grandmother has stage 4 cancer. She is her POA. She is home on hospice. For the last month they have been doing a lot with making decisions for her.   She is still struggling in her marriage and that is affecting her mood a lot too.   She is on straterra and Azstarys. We discussed stopping straterra and she was agreeable to this.   She didn't notice too much of a difference around her cycle with taking the additional Vraylar, so we will stop the 1.5mg.  She checked on her PHQ 9 that she has thoughts she would be better off dead. She does report having thoughts she wished everything were over. However, she states she never comes up with a plan and would never do anything to hurt herself due to her kids.   She was in therapy with [facility], but her insurance changed and it got to be too expensive. She is going to possibly look into getting into therapy somewhere else that might be cheaper.   We discussed doing a 4 month follow up, just because visits here are expensive on her insurance, but I advised her to call if she needs to get in sooner.     Patient presents with symptoms and behaviors that are consistent with the following DSM-5 diagnoses:  Bipolar disorder   2. ADHD    The following portions of the patient's history were reviewed and updated as appropriate: allergies, current medications, past family history, past medical history,  past social history, past surgical history and problem list.    PAST OUTPATIENT TREATMENT  Diagnosis treated:  Bipolar Disorder  Generalized anxiety disorder  Postpartum depression    Treatment Type:  Medication Management  Genesight testing done Jan 2022, normal converter of folic acid.  Prior Psychiatric Medications:  Wellbutrin - worse anxiety.  Zoloft - sedating all the time  Effexor - inefficacy up to 150mg.  Abilify - inefficacy up to 15mg.  Buspirone - up to 10mg BID w/o efficacy, helpful at 15 mg BID.  Latuda - cost prohibitive.  Lamictal - helpful  Vraylar-effective  Pristiq-ineffective at 25 mg.  Trazodone-effective for sleep at 50 mg.  Support Groups:  None   Sequelae Of Mental Disorder:  family disruption, emotional distress    Interval History  Improved    Side Effects  No side effects noted.       Past Medical History:  Past Medical History:   Diagnosis Date    Anxiety     Depression        Social History:  Social History     Socioeconomic History    Marital status:    Tobacco Use    Smoking status: Never    Smokeless tobacco: Never   Vaping Use    Vaping Use: Never used   Substance and Sexual Activity    Alcohol use: No     Comment: socially    Drug use: Yes     Frequency: 1.0 times per week     Types: Marijuana     Comment: smokes cannabis weekly.       Family History:  History reviewed. No pertinent family history.    Past Surgical History:  Past Surgical History:   Procedure Laterality Date    KNEE SURGERY      BOTH SIDES       Problem List:  Patient Active Problem List   Diagnosis    RODERICK (generalized anxiety disorder)    Bipolar I disorder, most recent episode depressed    Attention deficit hyperactivity disorder    Insomnia due to mental disorder    Obesity with body mass index 30 or greater    Severe episode of recurrent major depressive disorder, without psychotic features       Allergy:   Allergies   Allergen Reactions    Biaxin [Clarithromycin]     Ceclor [Cefaclor]          Discontinued Medications:  Medications Discontinued During This Encounter   Medication Reason    atomoxetine (STRATTERA) 40 MG capsule     Cariprazine HCl (Vraylar) 1.5 MG capsule capsule     lamoTRIgine (LaMICtal) 200 MG tablet Reorder    Cariprazine HCl (Vraylar) 3 MG capsule capsule Reorder           Current Medications:   Current Outpatient Medications   Medication Sig Dispense Refill    Cariprazine HCl (Vraylar) 3 MG capsule capsule Take 1 capsule by mouth Every Morning. 90 capsule 3    lamoTRIgine (LaMICtal) 200 MG tablet Take 1 tablet by mouth Daily. 90 tablet 3    Serdexmethylphen-Dexmethylphen (azSTARys) 39.2-7.8 MG capsule Take 1 capsule by mouth Every Morning. 30 capsule 0     No current facility-administered medications for this visit.         Psychological ROS: positive for - anxiety, concentration difficulties, depression, irritability and sleep disturbances  negative for - behavioral disorder, hallucinations, hostility or mood swings      Physical Exam:   Blood pressure 130/79, pulse 83, SpO2 98%.    Mental Status Exam:   Hygiene:   good  Cooperation:  Cooperative  Eye Contact:  Good  Psychomotor Behavior:  Appropriate  Affect:  Appropriate  Mood: euthymic  Speech:  Normal  Thought Process:  Goal directed and Linear  Thought Content:  Normal  Suicidal:  None  Homicidal:  None  Hallucinations:  None  Delusion:  None  Memory:  Intact  Orientation:  Person, Place, Time and Situation  Reliability:  good  Insight:  Good  Judgement:  Good  Impulse Control:  Good  Physical/Medical Issues:  No      Mental status exam was reviewed and compared to visit on 10/9/23 and appropriate updates were made.     PHQ-9 Depression Screening    Little interest or pleasure in doing things? 2-->more than half the days   Feeling down, depressed, or hopeless? 3-->nearly every day   Trouble falling or staying asleep, or sleeping too much? 0-->not at all   Feeling tired or having little energy? 2-->more than half the days    Poor appetite or overeating? 0-->not at all   Feeling bad about yourself - or that you are a failure or have let yourself or your family down? 3-->nearly every day   Trouble concentrating on things, such as reading the newspaper or watching television? 3-->nearly every day   Moving or speaking so slowly that other people could have noticed? Or the opposite - being so fidgety or restless that you have been moving around a lot more than usual? 0-->not at all   Thoughts that you would be better off dead, or of hurting yourself in some way? 2-->more than half the days   PHQ-9 Total Score 15   If you checked off any problems, how difficult have these problems made it for you to do your work, take care of things at home, or get along with other people? extremely difficult      Never smoker    I advised Kaylee of the risks of tobacco use.     Assessment & Plan   Diagnoses and all orders for this visit:    1. Bipolar I disorder, most recent episode depressed (Primary)  -     Cariprazine HCl (Vraylar) 3 MG capsule capsule; Take 1 capsule by mouth Every Morning.  Dispense: 90 capsule; Refill: 3  -     lamoTRIgine (LaMICtal) 200 MG tablet; Take 1 tablet by mouth Daily.  Dispense: 90 tablet; Refill: 3    2. RODERICK (generalized anxiety disorder)  -     Cariprazine HCl (Vraylar) 3 MG capsule capsule; Take 1 capsule by mouth Every Morning.  Dispense: 90 capsule; Refill: 3    3. Attention deficit hyperactivity disorder (ADHD), predominantly inattentive type        Continue lamotrigine 200mg daily.  Continue caripiprazine 3mg daily. Stop 1.5mg week before cycle.   Stop straterra.   Continue Azstarys 39.2/7.8 once daily in the morning.       Visit Diagnoses:    ICD-10-CM ICD-9-CM   1. Bipolar I disorder, most recent episode depressed  F31.30 296.50   2. RODERICK (generalized anxiety disorder)  F41.1 300.02   3. Attention deficit hyperactivity disorder (ADHD), predominantly inattentive type  F90.0 314.00           TREATMENT PLAN/GOALS:  Continue supportive psychotherapy efforts and medications as indicated. Treatment and medication options discussed during today's visit. Patient ackowledged and verbally consented to continue with current treatment plan and was educated on the importance of compliance with treatment and follow-up appointments.    MEDICATION ISSUES:  INSPECT reviewed as expected.     Discussed medication options and treatment plan of prescribed medication as well as the risks, benefits, and side effects including potential falls, possible impaired driving and metabolic adversities among others. Patient is agreeable to call the office with any worsening of symptoms or onset of side effects. Patient is agreeable to call 911 or go to the nearest ER should he/she begin having SI/HI. No medication side effects or related complaints today.     MEDS ORDERED DURING VISIT:  New Medications Ordered This Visit   Medications    Cariprazine HCl (Vraylar) 3 MG capsule capsule     Sig: Take 1 capsule by mouth Every Morning.     Dispense:  90 capsule     Refill:  3    lamoTRIgine (LaMICtal) 200 MG tablet     Sig: Take 1 tablet by mouth Daily.     Dispense:  90 tablet     Refill:  3       Return in about 4 months (around 5/11/2024).         This document has been electronically signed by ELE Shelton  January 11, 2024 09:03 EST    Part of this note may be an electronic transcription/translation of spoken language to printed text using the Dragon Dictation System.

## 2024-01-11 ENCOUNTER — OFFICE VISIT (OUTPATIENT)
Dept: PSYCHIATRY | Facility: CLINIC | Age: 29
End: 2024-01-11
Payer: COMMERCIAL

## 2024-01-11 VITALS — SYSTOLIC BLOOD PRESSURE: 130 MMHG | OXYGEN SATURATION: 98 % | DIASTOLIC BLOOD PRESSURE: 79 MMHG | HEART RATE: 83 BPM

## 2024-01-11 DIAGNOSIS — F31.30 BIPOLAR I DISORDER, MOST RECENT EPISODE DEPRESSED: Primary | Chronic | ICD-10-CM

## 2024-01-11 DIAGNOSIS — F41.1 GAD (GENERALIZED ANXIETY DISORDER): Chronic | ICD-10-CM

## 2024-01-11 DIAGNOSIS — F90.0 ATTENTION DEFICIT HYPERACTIVITY DISORDER (ADHD), PREDOMINANTLY INATTENTIVE TYPE: Chronic | ICD-10-CM

## 2024-01-11 RX ORDER — LAMOTRIGINE 200 MG/1
200 TABLET ORAL DAILY
Qty: 90 TABLET | Refills: 3 | Status: SHIPPED | OUTPATIENT
Start: 2024-01-11 | End: 2025-01-10

## 2024-01-11 NOTE — PATIENT INSTRUCTIONS
Suicidal Feelings: How to Help Yourself  Suicide is when you end your own life. Suicidal ideation includes expressing thoughts about, or a preoccupation with, ending your own life. There are many things you can do to help yourself feel better when struggling with these feelings. Many services and people are available to support you and others who struggle with similar feelings.  If you ever feel like you may hurt yourself or others, or have thoughts about taking your own life, get help right away. To get help:  Go to your nearest emergency department.  Call your local emergency services (261 in the [place]  Call the [facility] services helpline (211 in the [place]  Call or text a suicide hotline to speak with a trained counselor. The following suicide hotlines are available in the [place]:  0-685-295-TALK (1-163.525.7191 or 328 in the [place]  5-877-ZPNBMYP (1-337.109.7506).  Text 565495. This is the Crisis Text Line in the [place]  1-290.593.2413. This is a hotline for French speakers.  1-447.836.3347. This is a hotline for TTY users.  2-482-3-U-NAFISA (1-698.169.1854). This is a hotline for lesbian, sahu, bisexual, transgender, or questioning youth.  For a list of hotlines in [place], visit suicide.org/hotlines/international/pduyvr-hpelhod-lankephp.html  Contact a crisis center or a local suicide prevention center. To find a crisis center or suicide prevention center:  Call your local hospital, clinic, community service organization, mental health center, social service provider, or health department. Ask for help with connecting to a crisis center.  For a list of crisis centers in the [place], visit: suicidepreventionlifeline.org  For a list of crisis centers in [place], visit: suicideprevention.ca  How to help yourself feel better    Promise yourself that you will not do anything bad or extreme when you have suicidal feelings. Remember the times you have felt hopeful.  Many people have  gotten through suicidal thoughts and feelings, and you can too.  If you have had these feelings before, remind yourself that you can get through them again.  Let family, friends, teachers, or counselors know how you are feeling. Do not separate yourself from those who care about you and want to help you.  Talk with someone every day, even if you do not feel like talking to anyone or being with other people.  Face-to-face conversation is best to help them understand your feelings.  Contact a mental health care provider and work with this person regularly.  Make a safety plan that you can follow during a crisis.  Include phone numbers of suicide prevention hotlines, mental health professionals, and trusted friends and family members you can call during an emergency.  Save these numbers on your phone.  If you are thinking of taking a lot of medicine, give your medicine to someone who can give it to you as prescribed.  If you are on antidepressants and are concerned you will overdose, tell your health care provider so that he or she can give you safer medicines.  Try to stick to your routines and follow a schedule every day. Make self-care a priority.  Make a list of realistic goals, and cross them off when you achieve them. Accomplishments can give you a sense of worth.  Wait until you are feeling better before doing things that you find difficult or unpleasant.  Do things that you have always enjoyed to take your mind off your feelings.  Try reading a book, or listening to or playing music.  Spending time outside, in nature, may help you feel better.  Follow these instructions at home:    Visit your primary health care provider every year for a physical and a mental health checkup.  Take over-the-counter and prescription medicines only as told by your health care provider.  Ask your health care provider about the possible side effects of any medicines you are taking.  Ask your health care provider about whether  suicidal ideation is a possible side effect of any of your medicines.  Learn about suicidal ideation and what increases the risk for the development of suicidal thoughts.  Eat a well-balanced diet, and eat regular meals.  Get plenty of rest.  Exercise if you are able. Just 30 minutes of exercise each day can help you feel better.  Keep your living space well lit.  Do not use alcohol or drugs. Remove these substances from your home.  General recommendations  Remove weapons, poisons, knives, and other deadly items from your home.  Work with a mental health care provider as needed.  When you are feeling well, write yourself a letter with tips and support that you can read when you are not feeling well.  Remember that life's difficulties can be sorted out with help. Conditions can be treated, and you can learn behaviors and ways of thinking that will help you.  Work with your health care provider or counselor to learn ways of coping with your thoughts and feelings.  Where to find more information  National Suicide Prevention Lifeline: www.suicidepreventionlifeline.org  Hopeline: www.hopeline.Catch.com  American Foundation for Suicide Prevention: www.afsp.org  The [place] Project (for lesbian, sahu, bisexual, transgender, or questioning youth): www.thetrevorproject.org  National [place] of Mental Health: www.nimh.nih.gov/health/topics/suicide-prevention  Suicide Prevention Resources: afsp.org/suicide-prevention-resources  Contact a health care provider if:  You feel as though you are a burden to others.  You feel agitated, angry, vengeful, or have extreme mood swings.  You have withdrawn from family and friends.  You are frequently using drugs or alcohol.  Get help right away if:  You are talking about suicide or wishing to die.  You start making plans for how to commit suicide.  You feel that you have no reason to live.  You start making plans for putting your affairs in order, saying goodbye, or giving your possessions  away.  You feel guilt, shame, or unbearable pain, and it seems like there is no way out.  You are engaging in risky behaviors that could lead to death.  If you have any of these thoughts or symptoms, get help right away:  Go to your nearest emergency department or crisis center.  Call emergency services (911 in the [place]  Call or text a suicide crisis helpline.  Summary  Suicide is when you take your own life. Suicidal feelings are thoughts about ending your own life.  Promise yourself that you will not do anything bad or extreme when you have suicidal feelings.  Let family, friends, teachers, or counselors know how you are feeling.  Get help right away if you start making plans for how to commit suicide.  This information is not intended to replace advice given to you by your health care provider. Make sure you discuss any questions you have with your health care provider.  Document Revised: 07/14/2022 Document Reviewed: 04/28/2022  Elsevier Patient Education © 2023 [employer]vier Inc.

## 2024-02-06 DIAGNOSIS — F90.0 ATTENTION DEFICIT HYPERACTIVITY DISORDER (ADHD), PREDOMINANTLY INATTENTIVE TYPE: Chronic | ICD-10-CM

## 2024-02-06 RX ORDER — SERDEXMETHYLPHENIDATE AND DEXMETHYLPHENIDATE 7.8; 39.2 MG/1; MG/1
1 CAPSULE ORAL EVERY MORNING
Qty: 30 CAPSULE | Refills: 0 | Status: SHIPPED | OUTPATIENT
Start: 2024-02-06

## 2024-02-06 NOTE — TELEPHONE ENCOUNTER
Rx Refill Note  Requested Prescriptions     Pending Prescriptions Disp Refills    Serdexmethylphen-Dexmethylphen (azSTARys) 39.2-7.8 MG capsule 30 capsule 0     Sig: Take 1 capsule by mouth Every Morning.        Last office visit with prescribing clinician: 1/11/2024     Next office visit with prescribing clinician: 5/9/2024     Office Visit with Sharon Matias APRN (01/11/2024)     [facility] Urine Drug Screen - (12/13/2022)     Inspect Fill 1/8/24    Felisha Dudley  02/06/24, 09:19 EST

## 2024-03-06 DIAGNOSIS — F90.0 ATTENTION DEFICIT HYPERACTIVITY DISORDER (ADHD), PREDOMINANTLY INATTENTIVE TYPE: Chronic | ICD-10-CM

## 2024-03-06 RX ORDER — SERDEXMETHYLPHENIDATE AND DEXMETHYLPHENIDATE 7.8; 39.2 MG/1; MG/1
1 CAPSULE ORAL EVERY MORNING
Qty: 30 CAPSULE | Refills: 0 | Status: SHIPPED | OUTPATIENT
Start: 2024-03-06

## 2024-03-06 NOTE — TELEPHONE ENCOUNTER
Rx Refill Note  Requested Prescriptions     Pending Prescriptions Disp Refills    Serdexmethylphen-Dexmethylphen (azSTARys) 39.2-7.8 MG capsule 30 capsule 0     Sig: Take 1 capsule by mouth Every Morning.      Last office visit with prescribing clinician: 1/11/2024   Last telemedicine visit with prescribing clinician: Visit date not found   Next office visit with prescribing clinician: 5/9/2024   Office Visit with Sharon Matias APRN (01/11/2024)   [facility] Urine Drug Screen - (12/13/2022)                     Would you like a call back once the refill request has been completed: [] Yes [] No    If the office needs to give you a call back, can they leave a voicemail: [] Yes [] No    Odalis Finn MA  03/06/24, 12:42 EST    LAST FILL 2-7-24; PT SAYS NO ISSUES WITH [facility] [place] KEEPING THIS IN STOCK, SINCE IT IS A NEWER MED. UPLOADED

## 2024-04-08 DIAGNOSIS — F90.0 ATTENTION DEFICIT HYPERACTIVITY DISORDER (ADHD), PREDOMINANTLY INATTENTIVE TYPE: Chronic | ICD-10-CM

## 2024-04-08 RX ORDER — SERDEXMETHYLPHENIDATE AND DEXMETHYLPHENIDATE 7.8; 39.2 MG/1; MG/1
1 CAPSULE ORAL EVERY MORNING
Qty: 30 CAPSULE | Refills: 0 | Status: SHIPPED | OUTPATIENT
Start: 2024-04-08

## 2024-04-08 NOTE — TELEPHONE ENCOUNTER
Rx Refill Note  Requested Prescriptions     Pending Prescriptions Disp Refills    Serdexmethylphen-Dexmethylphen (azSTARys) 39.2-7.8 MG capsule 30 capsule 0     Sig: Take 1 capsule by mouth Every Morning.        Last office visit with prescribing clinician: 1/11/2024     Next office visit with prescribing clinician: 5/9/2024     Office Visit with Sharon Matias APRN (01/11/2024)     [facility] Urine Drug Screen - (12/13/2022)     Inspect Fill 3/8/24    Felisha Dudley  04/08/24, 10:09 EDT

## 2024-05-09 ENCOUNTER — OFFICE VISIT (OUTPATIENT)
Dept: PSYCHIATRY | Facility: CLINIC | Age: 29
End: 2024-05-09
Payer: COMMERCIAL

## 2024-05-09 DIAGNOSIS — F41.1 GAD (GENERALIZED ANXIETY DISORDER): Chronic | ICD-10-CM

## 2024-05-09 DIAGNOSIS — F90.0 ATTENTION DEFICIT HYPERACTIVITY DISORDER (ADHD), PREDOMINANTLY INATTENTIVE TYPE: Chronic | ICD-10-CM

## 2024-05-09 DIAGNOSIS — F31.30 BIPOLAR I DISORDER, MOST RECENT EPISODE DEPRESSED: Primary | Chronic | ICD-10-CM

## 2024-05-09 RX ORDER — LAMOTRIGINE 200 MG/1
200 TABLET ORAL DAILY
Qty: 90 TABLET | Refills: 3 | Status: SHIPPED | OUTPATIENT
Start: 2024-05-09 | End: 2025-05-09

## 2024-05-09 RX ORDER — SERDEXMETHYLPHENIDATE AND DEXMETHYLPHENIDATE 7.8; 39.2 MG/1; MG/1
1 CAPSULE ORAL 2 TIMES DAILY
Qty: 60 CAPSULE | Refills: 0 | Status: SHIPPED | OUTPATIENT
Start: 2024-05-09

## 2024-05-16 ENCOUNTER — PRIOR AUTHORIZATION (OUTPATIENT)
Dept: PSYCHIATRY | Facility: CLINIC | Age: 29
End: 2024-05-16
Payer: COMMERCIAL

## 2024-05-20 NOTE — TELEPHONE ENCOUNTER
PA denied for 2 daily    Has to try the highest dose first before anything dosing can be twice daily of the Azstarys.    Patient informed  Paper faxed to pharmacy

## 2024-06-27 DIAGNOSIS — F90.0 ATTENTION DEFICIT HYPERACTIVITY DISORDER (ADHD), PREDOMINANTLY INATTENTIVE TYPE: Chronic | ICD-10-CM

## 2024-06-27 RX ORDER — SERDEXMETHYLPHENIDATE AND DEXMETHYLPHENIDATE 7.8; 39.2 MG/1; MG/1
1 CAPSULE ORAL 2 TIMES DAILY
Qty: 60 CAPSULE | Refills: 0 | Status: SHIPPED | OUTPATIENT
Start: 2024-06-27

## 2024-06-27 NOTE — TELEPHONE ENCOUNTER
Patient requesting Azstarys 39.2-7.8 mg. She said she is past due.    Rx sent to pharmacy  5/9/24  Last seen 5/9/24  Next appt 9/10/24  Inspect attached

## 2024-07-02 ENCOUNTER — TELEPHONE (OUTPATIENT)
Dept: PSYCHIATRY | Facility: CLINIC | Age: 29
End: 2024-07-02
Payer: COMMERCIAL

## 2024-07-02 DIAGNOSIS — F90.0 ATTENTION DEFICIT HYPERACTIVITY DISORDER (ADHD), PREDOMINANTLY INATTENTIVE TYPE: Chronic | ICD-10-CM

## 2024-07-02 RX ORDER — SERDEXMETHYLPHENIDATE AND DEXMETHYLPHENIDATE 7.8; 39.2 MG/1; MG/1
1 CAPSULE ORAL DAILY
Qty: 30 CAPSULE | Refills: 0 | Status: SHIPPED | OUTPATIENT
Start: 2024-07-02

## 2024-07-02 NOTE — TELEPHONE ENCOUNTER
The patient needs a new script for Azstary 39.2-7.8 mg one time daily sent to her pharmacy.     Ins will not cover 2 tab daily. She is willing to take once daily at this point.  She has been without medication for two weeks Please advise

## 2024-07-03 NOTE — TELEPHONE ENCOUNTER
Patient notified new Rx sent for once a day.  Her pharmacy called and said the prescription was ready for

## 2024-08-14 DIAGNOSIS — F90.0 ATTENTION DEFICIT HYPERACTIVITY DISORDER (ADHD), PREDOMINANTLY INATTENTIVE TYPE: Chronic | ICD-10-CM

## 2024-08-14 RX ORDER — SERDEXMETHYLPHENIDATE AND DEXMETHYLPHENIDATE 7.8; 39.2 MG/1; MG/1
1 CAPSULE ORAL DAILY
Qty: 30 CAPSULE | Refills: 0 | Status: SHIPPED | OUTPATIENT
Start: 2024-08-14

## 2024-08-14 NOTE — TELEPHONE ENCOUNTER
Rx Refill Note  Requested Prescriptions     Pending Prescriptions Disp Refills    Serdexmethylphen-Dexmethylphen (azSTARys) 39.2-7.8 MG capsule 30 capsule 0     Sig: Take 1 capsule by mouth Daily.      Last office visit with prescribing clinician: 5/9/2024   Last telemedicine visit with prescribing clinician: Visit date not found   Next office visit with prescribing clinician: 9/10/2024   Office Visit with Sharon Matias APRN (05/09/2024)   [facility] Urine Drug Screen - (12/13/2022)                     Would you like a call back once the refill request has been completed: [] Yes [] No    If the office needs to give you a call back, can they leave a voicemail: [] Yes [] No  BEHAVIORAL HEALTH - SCAN - INSPECT [facility] 8-14-24 STEPHANIE (08/14/2024)     Marilu Greene  08/14/24, 09:42 EDT

## 2024-09-09 NOTE — PROGRESS NOTES
[facility]   [address], [address]  [place] [address]  [phone]  Sharon Matias, MSN, APRN, PMHNP-BC    This provider is located in [place], [place] using a secure [employer] Video Visit through [employer]. Patient is being seen remotely via telehealth at their home address in [place], and stated they are in a secure environment for this session. The patient's condition being diagnosed/treated is appropriate for telemedicine. The provider identified herself as well as her credentials.   The patient, and/or patients guardian, consent to be seen remotely, and when consent is given they understand that the consent allows for patient identifiable information to be sent to a third party as needed.   They may refuse to be seen remotely at any time. The electronic data is encrypted and password protected, and the patient and/or guardian has been advised of the potential risks to privacy not withstanding such measures.   PT Identifiers used: Name and .     You have chosen to receive care through a telehealth visit.  Do you consent to use a video/audio connection for your medical care today? Yes     Subjective   Kaylee Vergara is a 29 y.o. female who presents today for follow up for psychiatric medication management.     Chief Complaint:  Anxiety, bipolar disorder, ADHD    History of Present Illness:     Medication adjustments last visit:   Continue lamotrigine 200mg daily.  Continue caripiprazine 3mg daily.   Continue Azstarys 39.2/7.8.    Patient seen today via telehealth. She states her mood overall is doing well. Since we last had an appointment, her grandmother passed. She states it went as well as it could have. She is utilizing the free grief therapy through hospice. It is available for 13 months after the patient passes. She feels like this has been helpful. Overall, she feels like medications are working well and mood is stable.   Denies any suicidal thoughts.       5/9/24:  Patient here for follow up.   She has moved out on her own, to an apartment. She feels like she is more at peace now.   She is still in counseling, but due to her insurance being through [employer], she no longer comes here.   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   She states she feels like her Azstarys is only lasting about half the day now. She was asking if she could get a second dose.   I advised her we could try a second dose daily, but insurance may not approve. I will send it in. If they will not approve, I advised we could add a lunch time dose of methylphenidate immediate release.   Still endorses some depression and anxiety.   Denies any suicidal thoughts.     Patient presents with symptoms and behaviors that are consistent with the following DSM-5 diagnoses:  Bipolar disorder   2. ADHD    The following portions of the patient's history were reviewed and updated as appropriate: allergies, current medications, past family history, past medical history, past social history, past surgical history and problem list.    PAST OUTPATIENT TREATMENT  Diagnosis treated:  Bipolar Disorder  Generalized anxiety disorder  Postpartum depression    Treatment Type:  Medication Management  Genesight testing done Jan 2022, normal converter of folic acid.  Prior Psychiatric Medications:  Wellbutrin - worse anxiety.  Zoloft - sedating all the time  Effexor - inefficacy up to 150mg.  Abilify - inefficacy up to 15mg.  Buspirone - up to 10mg BID w/o efficacy, helpful at 15 mg BID.  Latuda - cost prohibitive.  Lamictal - helpful  Vraylar-effective  Pristiq-ineffective at 25 mg.  Trazodone-effective for sleep at 50 mg.  Support Groups:  None   Sequelae Of Mental Disorder:  family disruption, emotional distress    Interval History  Improved    Side Effects  No side effects noted.       Past Medical History:  Past Medical History:   Diagnosis Date    Anxiety     Depression        Social History:  Social History      Socioeconomic History    Marital status:    Tobacco Use    Smoking status: Never    Smokeless tobacco: Never   Vaping Use    Vaping status: Never Used   Substance and Sexual Activity    Alcohol use: No     Comment: socially    Drug use: Yes     Frequency: 1.0 times per week     Types: Marijuana     Comment: smokes cannabis weekly.       Family History:  History reviewed. No pertinent family history.    Past Surgical History:  Past Surgical History:   Procedure Laterality Date    KNEE SURGERY      BOTH SIDES       Problem List:  Patient Active Problem List   Diagnosis    RODERICK (generalized anxiety disorder)    Bipolar I disorder, most recent episode depressed    Attention deficit hyperactivity disorder    Insomnia due to mental disorder    Obesity with body mass index 30 or greater    Severe episode of recurrent major depressive disorder, without psychotic features       Allergy:   Allergies   Allergen Reactions    Biaxin [Clarithromycin]     Ceclor [Cefaclor]         Discontinued Medications:  There are no discontinued medications.    Current Medications:   Current Outpatient Medications   Medication Sig Dispense Refill    Cariprazine HCl (Vraylar) 3 MG capsule capsule Take 1 capsule by mouth Every Morning. 90 capsule 3    lamoTRIgine (LaMICtal) 200 MG tablet Take 1 tablet by mouth Daily. 90 tablet 3    Serdexmethylphen-Dexmethylphen (azSTARys) 39.2-7.8 MG capsule Take 1 capsule by mouth Daily. 30 capsule 0     No current facility-administered medications for this visit.     Physical Exam:   There were no vitals taken for this visit.    MENTAL STATUS EXAM   General Appearance:  Cleanly groomed and dressed  Eye Contact:  Good eye contact  Attitude:  Cooperative  Motor Activity:  Normal gait, posture  Muscle Strength:  Normal  Speech:  Normal rate, tone, volume  Language:  Spontaneous  Mood and affect:  Normal, pleasant  Hopelessness:  Denies  Loneliness: Denies  Thought Process:  Logical  Associations/  Thought Content:  No delusions  Hallucinations:  None  Suicidal Ideations:  Not present  Homicidal Ideation:  Not present  Sensorium:  Alert  Orientation:  Person, time, situation and place  Immediate Recall, Recent, and Remote Memory:  Intact  Attention Span/ Concentration:  Good  Fund of Knowledge:  Appropriate for age and educational level  Intellectual Functioning:  Average range  Insight:  Good  Judgement:  Good  Reliability:  Good  Impulse Control:  Good       PHQ-9 Depression Screening    Little interest or pleasure in doing things?     Feeling down, depressed, or hopeless?     Trouble falling or staying asleep, or sleeping too much?     Feeling tired or having little energy?     Poor appetite or overeating?     Feeling bad about yourself - or that you are a failure or have let yourself or your family down?     Trouble concentrating on things, such as reading the newspaper or watching television?     Moving or speaking so slowly that other people could have noticed? Or the opposite - being so fidgety or restless that you have been moving around a lot more than usual?     Thoughts that you would be better off dead, or of hurting yourself in some way? 0-->not at all   PHQ-9 Total Score     If you checked off any problems, how difficult have these problems made it for you to do your work, take care of things at home, or get along with other people?        Never smoker    I advised Kaylee of the risks of tobacco use.     Assessment & Plan   Diagnoses and all orders for this visit:    1. Bipolar I disorder, most recent episode depressed (Primary)    2. Attention deficit hyperactivity disorder (ADHD), predominantly inattentive type      Continue lamotrigine 200mg daily.  Continue caripiprazine 3mg daily.   Continue Azstarys 39.2/7.8      Visit Diagnoses:    ICD-10-CM ICD-9-CM   1. Bipolar I disorder, most recent episode depressed  F31.30 296.50   2. Attention deficit hyperactivity disorder (ADHD), predominantly  inattentive type  F90.0 314.00     TREATMENT PLAN/GOALS: Continue supportive psychotherapy efforts and medications as indicated. Treatment and medication options discussed during today's visit. Patient ackowledged and verbally consented to continue with current treatment plan and was educated on the importance of compliance with treatment and follow-up appointments.    MEDICATION ISSUES:  INSPECT reviewed as expected.     Discussed medication options and treatment plan of prescribed medication as well as the risks, benefits, and side effects including potential falls, possible impaired driving and metabolic adversities among others. Patient is agreeable to call the office with any worsening of symptoms or onset of side effects. Patient is agreeable to call 911 or go to the nearest ER should he/she begin having SI/HI. No medication side effects or related complaints today.     MEDS ORDERED DURING VISIT:  No orders of the defined types were placed in this encounter.      Return in about 6 months (around 3/10/2025).         This document has been electronically signed by ELE Shelton  September 10, 2024 12:28 EDT    Part of this note may be an electronic transcription/translation of spoken language to printed text using the Dragon Dictation System.

## 2024-09-10 ENCOUNTER — TELEMEDICINE (OUTPATIENT)
Dept: PSYCHIATRY | Facility: CLINIC | Age: 29
End: 2024-09-10
Payer: COMMERCIAL

## 2024-09-10 DIAGNOSIS — F31.30 BIPOLAR I DISORDER, MOST RECENT EPISODE DEPRESSED: Primary | Chronic | ICD-10-CM

## 2024-09-10 DIAGNOSIS — F90.0 ATTENTION DEFICIT HYPERACTIVITY DISORDER (ADHD), PREDOMINANTLY INATTENTIVE TYPE: Chronic | ICD-10-CM

## 2024-09-10 PROCEDURE — 99214 OFFICE O/P EST MOD 30 MIN: CPT

## 2024-09-16 DIAGNOSIS — F90.0 ATTENTION DEFICIT HYPERACTIVITY DISORDER (ADHD), PREDOMINANTLY INATTENTIVE TYPE: Chronic | ICD-10-CM

## 2024-09-19 RX ORDER — SERDEXMETHYLPHENIDATE AND DEXMETHYLPHENIDATE 7.8; 39.2 MG/1; MG/1
1 CAPSULE ORAL DAILY
Qty: 30 CAPSULE | Refills: 0 | Status: SHIPPED | OUTPATIENT
Start: 2024-09-19

## 2024-10-21 DIAGNOSIS — F90.0 ATTENTION DEFICIT HYPERACTIVITY DISORDER (ADHD), PREDOMINANTLY INATTENTIVE TYPE: Chronic | ICD-10-CM

## 2024-10-21 RX ORDER — SERDEXMETHYLPHENIDATE AND DEXMETHYLPHENIDATE 7.8; 39.2 MG/1; MG/1
1 CAPSULE ORAL DAILY
Qty: 30 CAPSULE | Refills: 0 | Status: SHIPPED | OUTPATIENT
Start: 2024-10-21

## 2024-10-21 NOTE — TELEPHONE ENCOUNTER
Rx Refill Note  Requested Prescriptions     Pending Prescriptions Disp Refills    Serdexmethylphen-Dexmethylphen (azSTARys) 39.2-7.8 MG capsule 30 capsule 0     Sig: Take 1 capsule by mouth Daily.        Last office visit with prescribing clinician: 5/9/2024     Next office visit with prescribing clinician: 3/10/2025     Telemedicine with Sharon Matias APRN (09/10/2024)     [facility] Urine Drug Screen - (12/13/2022)     BEHAVIORAL HEALTH - SCAN - Inspect report, Isael, 10/21/24 (10/21/2024)     Inspect Fill 9/20/24    Felisha Dudley  10/21/24, 16:24 EDT

## 2024-11-06 ENCOUNTER — CLINICAL SUPPORT (OUTPATIENT)
Dept: PSYCHIATRY | Facility: CLINIC | Age: 29
End: 2024-11-06
Payer: COMMERCIAL

## 2024-11-06 DIAGNOSIS — F90.9 ATTENTION DEFICIT HYPERACTIVITY DISORDER (ADHD), UNSPECIFIED ADHD TYPE: Primary | Chronic | ICD-10-CM

## 2024-11-06 NOTE — PROGRESS NOTES
Kaylee came in today for her random UDS and pill count.  She verified her medication list and verified that she does use THC.  Also gave an adequate amount of specimen for her UDS      Azstarys 39.2-7.8 mg capsule #30 for 30.  Inspect fill and bottle of 10/22/24  Blue and grey capsule  429 on the blue and  on the grey,  #23  
You can access the FollowMyHealth Patient Portal offered by [facility] by registering at the following website: [email or web address]. By joining [facility]’s FollowMyHealth portal, you will also be able to view your health information using other applications (apps) compatible with our system.

## 2024-11-12 LAB
1OH-MIDAZOLAM UR QL SCN: NOT DETECTED NG/MG CREAT
6MAM UR QL SCN: NEGATIVE NG/ML
7AMINOCLONAZEPAM/CREAT UR: NOT DETECTED NG/MG CREAT
8OH-AMOXAPINE UR QL: NOT DETECTED
8OH-LOXAPINE UR QL SCN: NOT DETECTED
A-OH ALPRAZ/CREAT UR: NOT DETECTED NG/MG CREAT
A-OH-TRIAZOLAM/CREAT UR CFM: NOT DETECTED NG/MG CREAT
ALPRAZ/CREAT UR CFM: NOT DETECTED NG/MG CREAT
AMITRIP UR-MCNC: NOT DETECTED NG/ML
AMOXAPINE UR QL: NOT DETECTED
AMPHETAMINES UR QL SCN>500 NG/ML: NEGATIVE NG/ML
ANTICONVULSANTS UR: NEGATIVE
ANTIPSYCHOTICS UR: NEGATIVE
ARIPIPRAZOLE UR QL SCN: NOT DETECTED
ASENAPINE UR QL CFM: NOT DETECTED
BARBITURATES UR QL SCN: NEGATIVE NG/ML
BENZODIAZ SCN METH UR: NEGATIVE
BUPRENORPHINE UR QL SCN: NEGATIVE NG/ML
BUPROPION UR QL: NOT DETECTED
CANNABINOIDS UR QL CFM: NORMAL
CANNABINOIDS UR QL SCN: NORMAL NG/ML
CARBOXYTHC UR CFM-MCNC: >840 NG/MG CREAT
CARISOPRODOL UR QL: NEGATIVE NG/ML
CHLORPROMAZINE UR QL: NOT DETECTED
CITALOPRAM, UR: NOT DETECTED
CLOMIPRAMINE UR-MCNC: NOT DETECTED NG/ML
CLONAZEPAM/CREAT UR CFM: NOT DETECTED NG/MG CREAT
CLOZAPINE UR QL: NOT DETECTED
COCAINE+BZE UR QL SCN: NEGATIVE NG/ML
CREAT UR-MCNC: 119 MG/DL
DESALKYLFLURAZ/CREAT UR: NOT DETECTED NG/MG CREAT
DESIPRAMINE UR-MCNC: NOT DETECTED NG/ML
DIAZEPAM/CREAT UR: NOT DETECTED NG/MG CREAT
DOXEPIN UR-MCNC: NOT DETECTED NG/ML
DULOXETINE UR QL: NOT DETECTED
ETG ETS UR QL CFM: NORMAL
ETHANOL UR QL SCN: NORMAL NG/ML
ETHYL GLUCURONIDE UR CFM-MCNC: 1685 NG/MG CREAT
ETHYL SULFATE UR CFM-MCNC: 532 NG/MG CREAT
FENTANYL UR QL SCN: NEGATIVE NG/ML
FLUNITRAZEPAM UR QL SCN: NOT DETECTED NG/MG CREAT
FLUOXETINE UR QL SCN: NOT DETECTED
FLUPHENAZINE UR-MCNC: NOT DETECTED NG/ML
FLUVOXAMINE UR QL: NOT DETECTED
GABAPENTIN UR-MCNC: NEGATIVE UG/ML
HALOPERIDOL UR QL: NOT DETECTED
ILOPERIDONE UR QL CFM: NOT DETECTED
IMIPRAMINE UR-MCNC: NOT DETECTED NG/ML
KRATOM IA, UR: NEGATIVE NG/ML
LORAZEPAM/CREAT UR: NOT DETECTED NG/MG CREAT
LOXAPINE UR QL: NOT DETECTED
LURASIDONE UR QL CFM: NOT DETECTED
MAPROTILINE UR QL: NOT DETECTED
ME-PHENIDATE UR QL SCN: NORMAL NG/ML
MESORIDAZINE UR QL: NOT DETECTED
METHADONE UR QL SCN: NEGATIVE NG/ML
METHADONE+METAB UR QL SCN: NEGATIVE NG/ML
METHYLPHENIDATE UR: NOT DETECTED
MIDAZOLAM/CREAT UR CFM: NOT DETECTED NG/MG CREAT
MIRTAZAPINE UR-MCNC: NOT DETECTED UG/ML
MOLINDONE UR QL SCN: NOT DETECTED
NEFAZODONE UR QL: NOT DETECTED
NORCITALOPRAM UR QL: NOT DETECTED
NORCLOMIPRAMINE UR QL: NOT DETECTED
NORCLOZAPINE UR QL: NOT DETECTED
NORDIAZEPAM/CREAT UR: NOT DETECTED NG/MG CREAT
NORDOXEPIN UR QL: NOT DETECTED
NORFLUNITRAZEPAM UR-MCNC: NOT DETECTED NG/MG CREAT
NORFLUOXETINE UR-MCNC: NOT DETECTED NG/ML
NORSERTRALINE UR QL: NOT DETECTED
NORTRIP UR-MCNC: NOT DETECTED NG/ML
ODV UR-MCNC: NOT DETECTED NG/ML
OH-BUPROPION UR-MCNC: NOT DETECTED NG/ML
OLANZAPINE UR CFM-MCNC: NOT DETECTED NG/ML
OPIATES UR SCN-MCNC: NEGATIVE NG/ML
OXAZEPAM/CREAT UR: NOT DETECTED NG/MG CREAT
OXYCODONE CTO UR SCN-MCNC: NEGATIVE NG/ML
PAROXETINE UR-MCNC: NOT DETECTED NG/L
PCP UR QL SCN: NEGATIVE NG/ML
PERPHENAZINE UR QL: NOT DETECTED
PIMOZIDE, UR: NOT DETECTED
PPAA SERPL-MCNC: PRESENT NG/ML
PREGABALIN UR QL SCN: NOT DETECTED
PRESCRIBED MEDICATIONS: NORMAL
PROCHLORPERAZINE UR QL: NOT DETECTED
PROPOXYPH UR QL SCN: NEGATIVE NG/ML
PROTRIP UR QL: NOT DETECTED
QUETIAPINE CTO UR CFM-MCNC: NOT DETECTED NG/ML
RISPERIDONE UR QL: NOT DETECTED
SERTRALINE UR-MCNC: NOT DETECTED NG/ML
SYMPATHOMIMETICS UR QL: NORMAL
TAPENTADOL CTO UR SCN-MCNC: NEGATIVE NG/ML
TEMAZEPAM/CREAT UR: NOT DETECTED NG/MG CREAT
THIORIDAZINE UR-MCNC: NOT DETECTED UG/ML
THIOTHIXENE UR QL: NOT DETECTED
TRAMADOL UR QL SCN: NEGATIVE NG/ML
TRAZODONE UR QL: NOT DETECTED
TRAZODONE UR-MCNC: NOT DETECTED UG/ML
TRICYCLICS TESTED UR SCN: NEGATIVE
TRIFPERAZINE UR QL: NOT DETECTED
TRIMIPRAMINE UR QL: NOT DETECTED
VENLAFAXINE UR QL: NOT DETECTED
VILAZ UR QL SCN: NOT DETECTED
ZIPRASIDONE UR QL SCN: NOT DETECTED

## 2024-11-27 ENCOUNTER — PRIOR AUTHORIZATION (OUTPATIENT)
Dept: PSYCHIATRY | Facility: CLINIC | Age: 29
End: 2024-11-27
Payer: COMMERCIAL

## 2024-11-27 DIAGNOSIS — F90.0 ATTENTION DEFICIT HYPERACTIVITY DISORDER (ADHD), PREDOMINANTLY INATTENTIVE TYPE: Chronic | ICD-10-CM

## 2024-11-27 RX ORDER — SERDEXMETHYLPHENIDATE AND DEXMETHYLPHENIDATE 7.8; 39.2 MG/1; MG/1
1 CAPSULE ORAL DAILY
Qty: 30 CAPSULE | Refills: 0 | Status: SHIPPED | OUTPATIENT
Start: 2024-11-27

## 2024-12-27 DIAGNOSIS — F90.0 ATTENTION DEFICIT HYPERACTIVITY DISORDER (ADHD), PREDOMINANTLY INATTENTIVE TYPE: Chronic | ICD-10-CM

## 2024-12-27 RX ORDER — SERDEXMETHYLPHENIDATE AND DEXMETHYLPHENIDATE 7.8; 39.2 MG/1; MG/1
1 CAPSULE ORAL DAILY
Qty: 30 CAPSULE | Refills: 0 | Status: SHIPPED | OUTPATIENT
Start: 2024-12-27

## 2024-12-27 NOTE — TELEPHONE ENCOUNTER
Rx Refill Note  Requested Prescriptions     Pending Prescriptions Disp Refills    Serdexmethylphen-Dexmethylphen (azSTARys) 39.2-7.8 MG capsule 30 capsule 0     Sig: Take 1 capsule by mouth Daily.        Last office visit with prescribing clinician: 9/10/24     Next office visit with prescribing clinician: 3/10/2025     Telemedicine with Sharon Matias APRN (09/10/2024)     ToxAssure Flex 22, Ur w/DL - Urine, Random Void (11/06/2024 09:17)     BEHAVIORAL HEALTH - SCAN - Isael Zacarias, 12/27/24 (12/27/2024)     Inspect Fill 11/29/24    Felisha Dudley  12/27/24, 11:19 EST

## 2025-02-28 DIAGNOSIS — F90.0 ATTENTION DEFICIT HYPERACTIVITY DISORDER (ADHD), PREDOMINANTLY INATTENTIVE TYPE: Chronic | ICD-10-CM

## 2025-02-28 RX ORDER — SERDEXMETHYLPHENIDATE AND DEXMETHYLPHENIDATE 7.8; 39.2 MG/1; MG/1
1 CAPSULE ORAL DAILY
Qty: 30 CAPSULE | Refills: 0 | Status: SHIPPED | OUTPATIENT
Start: 2025-02-28

## 2025-02-28 NOTE — TELEPHONE ENCOUNTER
Rx Refill Note  Requested Prescriptions     Pending Prescriptions Disp Refills    Serdexmethylphen-Dexmethylphen (azSTARys) 39.2-7.8 MG capsule 30 capsule 0     Sig: Take 1 capsule by mouth Daily.        Last office visit with prescribing clinician: telehealth 9/10/24     Next office visit with prescribing clinician:4/28/25    Telemedicine with Sharon Matias APRN (09/10/2024)    ToxAssure Flex 22, Ur w/DL - Urine, Random Void (11/06/2024 09:17)     BEHAVIORAL HEALTH - SCAN - Inspect report LUKAS Matias, 2/28/25 (02/28/2025)       Inspect Fill 1/29/25    Felisha Dudley  02/28/25, 10:29 EST

## 2025-03-28 DIAGNOSIS — F41.1 GAD (GENERALIZED ANXIETY DISORDER): Chronic | ICD-10-CM

## 2025-03-28 DIAGNOSIS — F31.30 BIPOLAR I DISORDER, MOST RECENT EPISODE DEPRESSED: Chronic | ICD-10-CM

## 2025-03-28 DIAGNOSIS — F90.0 ATTENTION DEFICIT HYPERACTIVITY DISORDER (ADHD), PREDOMINANTLY INATTENTIVE TYPE: Chronic | ICD-10-CM

## 2025-03-28 RX ORDER — SERDEXMETHYLPHENIDATE AND DEXMETHYLPHENIDATE 7.8; 39.2 MG/1; MG/1
1 CAPSULE ORAL DAILY
Qty: 30 CAPSULE | Refills: 0 | Status: SHIPPED | OUTPATIENT
Start: 2025-03-28

## 2025-03-28 RX ORDER — LAMOTRIGINE 200 MG/1
200 TABLET ORAL DAILY
Qty: 90 TABLET | Refills: 0 | Status: SHIPPED | OUTPATIENT
Start: 2025-03-28 | End: 2026-03-28

## 2025-03-28 NOTE — TELEPHONE ENCOUNTER
Rx Refill Note  Requested Prescriptions     Pending Prescriptions Disp Refills    Cariprazine HCl (Vraylar) 3 MG capsule capsule 90 capsule 3     Sig: Take 1 capsule by mouth Every Morning.    lamoTRIgine (LaMICtal) 200 MG tablet 90 tablet 3     Sig: Take 1 tablet by mouth Daily.        Last office visit with prescribing clinician: 5/9/2024     Next office visit with prescribing clinician: 4/28/2025     Telemedicine with Sharon Matias APRN (09/10/2024)     Felisha Dudley  03/28/25, 11:06 EDT

## 2025-03-28 NOTE — TELEPHONE ENCOUNTER
Rx Refill Note  Requested Prescriptions     Pending Prescriptions Disp Refills    Serdexmethylphen-Dexmethylphen (azSTARys) 39.2-7.8 MG capsule 30 capsule 0     Sig: Take 1 capsule by mouth Daily.      Last office visit with prescribing clinician: Visit date not found   Last telemedicine visit with prescribing clinician: Visit date not found   Next office visit with prescribing clinician: Visit date not found   Telemedicine with Sharon Matias APRN (09/10/2024)      ToxAssure Flex 22, Ur w/DL - Urine, Random Void (11/06/2024 09:17)                  Would you like a call back once the refill request has been completed: [] Yes [] No    If the office needs to give you a call back, can they leave a voicemail: [] Yes [] No  Last sold 3/1/25  ToxAssure Flex 22, Ur w/DL - Urine, Random Void (11/06/2024 09:17)   Marilu Greene  03/28/25, 11:08 EDT

## 2025-05-08 DIAGNOSIS — F90.0 ATTENTION DEFICIT HYPERACTIVITY DISORDER (ADHD), PREDOMINANTLY INATTENTIVE TYPE: Chronic | ICD-10-CM

## 2025-05-08 RX ORDER — SERDEXMETHYLPHENIDATE AND DEXMETHYLPHENIDATE 7.8; 39.2 MG/1; MG/1
1 CAPSULE ORAL DAILY
Qty: 30 CAPSULE | Refills: 0 | Status: SHIPPED | OUTPATIENT
Start: 2025-05-08

## 2025-06-02 NOTE — PROGRESS NOTES
[facility]   [address], [address]  [place] [address]  [phone]  Sharon Matias, MSN, APRN, PMHNP-BC    Subjective   Kaylee Vergara is a 30 y.o. female who presents today for follow up for psychiatric medication management.     Chief Complaint:  Anxiety, bipolar disorder, ADHD    History of Present Illness:     Medication adjustments last visit:   Continue lamotrigine 200mg daily.  Continue caripiprazine 3mg daily.   Continue Azstarys 39.2/7.8    Patient or patient representative verbalized consent for the use of Ambient Listening during the visit with  ELE Shelton for chart documentation. 6/3/2025  16:26 EDT  History of Present Illness  The patient is a 30-year-old female who joined the virtual visit for a follow-up on her bipolar disorder and ADHD.    Bipolar Disorder and ADHD  - She feels that her medications, Lamictal and Vraylar, are working well.  - She states she has times where she will wonder if she really needs the medication, but when she misses a dose, she can tell that she has not taken it. This helps her see that it does help with mood, and she does still need them.   - The patient had a meniscus repair a month ago. She is doing well in recovery from this, and hoping to get back to work soon.   -Denies any SI/HI/AVH.     Supplemental information: She is under the care of Dr. Yuki Reed at [facility] for her primary care needs.     MEDICATIONS  Lamictal, Vraylar, Azstarys      Patient presents with symptoms and behaviors that are consistent with the following DSM-5 diagnoses:  Bipolar disorder   2. ADHD    The following portions of the patient's history were reviewed and updated as appropriate: allergies, current medications, past family history, past medical history, past social history, past surgical history and problem list.    PAST OUTPATIENT TREATMENT  Diagnosis treated:  Bipolar Disorder  Generalized anxiety  disorder  Postpartum depression    Treatment Type:  Medication Management  Genesight testing done Jan 2022, normal converter of folic acid.  Prior Psychiatric Medications:  Wellbutrin - worse anxiety.  Zoloft - sedating all the time  Effexor - inefficacy up to 150mg.  Abilify - inefficacy up to 15mg.  Buspirone - up to 10mg BID w/o efficacy, helpful at 15 mg BID.  Latuda - cost prohibitive.  Lamictal - helpful  Vraylar-effective  Pristiq-ineffective at 25 mg.  Trazodone-effective for sleep at 50 mg.  Support Groups:  None   Sequelae Of Mental Disorder:  family disruption, emotional distress    Interval History  Improved    Side Effects  No side effects noted.       Past Medical History:  Past Medical History:   Diagnosis Date    Anxiety     Depression        Social History:  Social History     Socioeconomic History    Marital status:    Tobacco Use    Smoking status: Never    Smokeless tobacco: Never   Vaping Use    Vaping status: Never Used   Substance and Sexual Activity    Alcohol use: No     Comment: socially    Drug use: Yes     Frequency: 1.0 times per week     Types: Marijuana     Comment: smokes cannabis weekly.       Family History:  History reviewed. No pertinent family history.    Past Surgical History:  Past Surgical History:   Procedure Laterality Date    KNEE SURGERY      BOTH SIDES       Problem List:  Patient Active Problem List   Diagnosis    RODERICK (generalized anxiety disorder)    Bipolar I disorder, most recent episode depressed    Attention deficit hyperactivity disorder    Insomnia due to mental disorder    Obesity with body mass index 30 or greater    Severe episode of recurrent major depressive disorder, without psychotic features       Allergy:   Allergies   Allergen Reactions    Biaxin [Clarithromycin]     Ceclor [Cefaclor]         Discontinued Medications:  Medications Discontinued During This Encounter   Medication Reason    Cariprazine HCl (Vraylar) 3 MG capsule capsule Reorder     lamoTRIgine (LaMICtal) 200 MG tablet Reorder    Serdexmethylphen-Dexmethylphen (azSTARys) 39.2-7.8 MG capsule Reorder       Current Medications:   Current Outpatient Medications   Medication Sig Dispense Refill    Cariprazine HCl (Vraylar) 3 MG capsule capsule Take 1 capsule by mouth Every Morning. 90 capsule 3    lamoTRIgine (LaMICtal) 200 MG tablet Take 1 tablet by mouth Daily. 90 tablet 3    [START ON 6/6/2025] Serdexmethylphen-Dexmethylphen (azSTARys) 39.2-7.8 MG capsule Take 1 capsule by mouth Daily. 30 capsule 0    [START ON 7/3/2025] Serdexmethylphen-Dexmethylphen (azSTARys) 39.2-7.8 MG capsule Take 1 capsule by mouth Every Morning. 30 capsule 0    [START ON 7/31/2025] Serdexmethylphen-Dexmethylphen (azSTARys) 39.2-7.8 MG capsule Take 1 capsule by mouth Every Morning. 30 capsule 0     No current facility-administered medications for this visit.     Physical Exam:   There were no vitals taken for this visit.    MENTAL STATUS EXAM   General Appearance:  Cleanly groomed and dressed  Eye Contact:  Good eye contact  Attitude:  Cooperative  Motor Activity:  Normal gait, posture  Muscle Strength:  Normal  Speech:  Normal rate, tone, volume  Language:  Spontaneous  Mood and affect:  Normal, pleasant  Hopelessness:  Denies  Loneliness: Denies  Thought Process:  Logical  Associations/ Thought Content:  No delusions  Hallucinations:  None  Suicidal Ideations:  Not present  Homicidal Ideation:  Not present  Sensorium:  Alert  Orientation:  Person, time, situation and place  Immediate Recall, Recent, and Remote Memory:  Intact  Attention Span/ Concentration:  Good  Fund of Knowledge:  Appropriate for age and educational level  Intellectual Functioning:  Average range  Insight:  Good  Judgement:  Good  Reliability:  Good  Impulse Control:  Good          Never smoker    I advised Kaylee of the risks of tobacco use.     Assessment & Plan   Diagnoses and all orders for this visit:    1. Bipolar I disorder, most recent episode  depressed  -     lamoTRIgine (LaMICtal) 200 MG tablet; Take 1 tablet by mouth Daily.  Dispense: 90 tablet; Refill: 3  -     Cariprazine HCl (Vraylar) 3 MG capsule capsule; Take 1 capsule by mouth Every Morning.  Dispense: 90 capsule; Refill: 3    2. RODERICK (generalized anxiety disorder)  -     Cariprazine HCl (Vraylar) 3 MG capsule capsule; Take 1 capsule by mouth Every Morning.  Dispense: 90 capsule; Refill: 3    3. Attention deficit hyperactivity disorder (ADHD), predominantly inattentive type  -     Serdexmethylphen-Dexmethylphen (azSTARys) 39.2-7.8 MG capsule; Take 1 capsule by mouth Daily.  Dispense: 30 capsule; Refill: 0  -     Serdexmethylphen-Dexmethylphen (azSTARys) 39.2-7.8 MG capsule; Take 1 capsule by mouth Every Morning.  Dispense: 30 capsule; Refill: 0  -     Serdexmethylphen-Dexmethylphen (azSTARys) 39.2-7.8 MG capsule; Take 1 capsule by mouth Every Morning.  Dispense: 30 capsule; Refill: 0        Assessment & Plan  1. Bipolar disorder: Stable.  - Continue current medications Lamictal and Vraylar.  - Prescription for 3-month supply and refills will be sent.    2. ADHD: Stable.  - Continue current medication Azstarys.  - Prescription for June, July, and August will be sent.    Follow-up in 6 months.        Continue lamotrigine 200mg daily.  Continue caripiprazine 3mg daily.   Continue Azstarys 39.2/7.8      Visit Diagnoses:    ICD-10-CM ICD-9-CM   1. Bipolar I disorder, most recent episode depressed  F31.30 296.50   2. RODERICK (generalized anxiety disorder)  F41.1 300.02   3. Attention deficit hyperactivity disorder (ADHD), predominantly inattentive type  F90.0 314.00       TREATMENT PLAN/GOALS: Continue supportive psychotherapy efforts and medications as indicated. Treatment and medication options discussed during today's visit. Patient ackowledged and verbally consented to continue with current treatment plan and was educated on the importance of compliance with treatment and follow-up  appointments.    MEDICATION ISSUES:  INSPECT reviewed as expected.     Discussed medication options and treatment plan of prescribed medication as well as the risks, benefits, and side effects including potential falls, possible impaired driving and metabolic adversities among others. Patient is agreeable to call the office with any worsening of symptoms or onset of side effects. Patient is agreeable to call 911 or go to the nearest ER should he/she begin having SI/HI. No medication side effects or related complaints today.     MEDS ORDERED DURING VISIT:  New Medications Ordered This Visit   Medications    lamoTRIgine (LaMICtal) 200 MG tablet     Sig: Take 1 tablet by mouth Daily.     Dispense:  90 tablet     Refill:  3    Cariprazine HCl (Vraylar) 3 MG capsule capsule     Sig: Take 1 capsule by mouth Every Morning.     Dispense:  90 capsule     Refill:  3    Serdexmethylphen-Dexmethylphen (azSTARys) 39.2-7.8 MG capsule     Sig: Take 1 capsule by mouth Daily.     Dispense:  30 capsule     Refill:  0     Refill for June    Serdexmethylphen-Dexmethylphen (azSTARys) 39.2-7.8 MG capsule     Sig: Take 1 capsule by mouth Every Morning.     Dispense:  30 capsule     Refill:  0     Refill for July    Serdexmethylphen-Dexmethylphen (azSTARys) 39.2-7.8 MG capsule     Sig: Take 1 capsule by mouth Every Morning.     Dispense:  30 capsule     Refill:  0     Refill for end of July/August       Return in about 6 months (around 12/3/2025).         This document has been electronically signed by ELE Shelton  Jaylyn 3, 2025 16:28 EDT    Part of this note may be an electronic transcription/translation of spoken language to printed text using the Dragon Dictation System.

## 2025-06-03 ENCOUNTER — TELEMEDICINE (OUTPATIENT)
Dept: PSYCHIATRY | Facility: CLINIC | Age: 30
End: 2025-06-03
Payer: COMMERCIAL

## 2025-06-03 DIAGNOSIS — F41.1 GAD (GENERALIZED ANXIETY DISORDER): Chronic | ICD-10-CM

## 2025-06-03 DIAGNOSIS — F31.30 BIPOLAR I DISORDER, MOST RECENT EPISODE DEPRESSED: Chronic | ICD-10-CM

## 2025-06-03 DIAGNOSIS — F90.0 ATTENTION DEFICIT HYPERACTIVITY DISORDER (ADHD), PREDOMINANTLY INATTENTIVE TYPE: Chronic | ICD-10-CM

## 2025-06-03 RX ORDER — SERDEXMETHYLPHENIDATE AND DEXMETHYLPHENIDATE 7.8; 39.2 MG/1; MG/1
1 CAPSULE ORAL EVERY MORNING
Qty: 30 CAPSULE | Refills: 0 | Status: SHIPPED | OUTPATIENT
Start: 2025-07-03

## 2025-06-03 RX ORDER — SERDEXMETHYLPHENIDATE AND DEXMETHYLPHENIDATE 7.8; 39.2 MG/1; MG/1
1 CAPSULE ORAL DAILY
Qty: 30 CAPSULE | Refills: 0 | Status: SHIPPED | OUTPATIENT
Start: 2025-06-06

## 2025-06-03 RX ORDER — LAMOTRIGINE 200 MG/1
200 TABLET ORAL DAILY
Qty: 90 TABLET | Refills: 3 | Status: SHIPPED | OUTPATIENT
Start: 2025-06-03 | End: 2026-06-03

## 2025-06-03 RX ORDER — SERDEXMETHYLPHENIDATE AND DEXMETHYLPHENIDATE 7.8; 39.2 MG/1; MG/1
1 CAPSULE ORAL EVERY MORNING
Qty: 30 CAPSULE | Refills: 0 | Status: SHIPPED | OUTPATIENT
Start: 2025-07-31